# Patient Record
Sex: FEMALE | Race: WHITE | NOT HISPANIC OR LATINO | Employment: FULL TIME | URBAN - METROPOLITAN AREA
[De-identification: names, ages, dates, MRNs, and addresses within clinical notes are randomized per-mention and may not be internally consistent; named-entity substitution may affect disease eponyms.]

---

## 2017-05-24 ENCOUNTER — PATIENT MESSAGE (OUTPATIENT)
Dept: OBSTETRICS AND GYNECOLOGY | Facility: CLINIC | Age: 57
End: 2017-05-24

## 2017-08-17 ENCOUNTER — TELEPHONE (OUTPATIENT)
Dept: OBSTETRICS AND GYNECOLOGY | Facility: CLINIC | Age: 57
End: 2017-08-17

## 2017-08-17 DIAGNOSIS — N60.19 FIBROCYSTIC BREAST, UNSPECIFIED LATERALITY: Primary | ICD-10-CM

## 2017-08-17 NOTE — TELEPHONE ENCOUNTER
----- Message from Irma Vasquez sent at 8/17/2017  3:51 PM CDT -----  Contact: sister  x_  1st Request  _  2nd Request  _  3rd Request      Who:Chiara     Why: Requesting Diagnostic MMG Orders     What Number to Call Back: [phone]    When to Expect a call back: (Before the end of the day)   -- if call after 3:00 call back will be tomorrow.

## 2017-09-27 ENCOUNTER — HOSPITAL ENCOUNTER (OUTPATIENT)
Dept: RADIOLOGY | Facility: HOSPITAL | Age: 57
Discharge: HOME | End: 2017-09-27
Attending: OBSTETRICS & GYNECOLOGY
Payer: COMMERCIAL

## 2017-09-27 VITALS — HEIGHT: 63 IN | WEIGHT: 168 LBS | BODY MASS INDEX: 29.77 KG/M2

## 2017-09-27 DIAGNOSIS — N60.19 FIBROCYSTIC BREAST, UNSPECIFIED LATERALITY: ICD-10-CM

## 2017-09-27 PROCEDURE — 77062 BREAST TOMOSYNTHESIS BI: CPT | Mod: TC

## 2017-09-27 PROCEDURE — 77062 BREAST TOMOSYNTHESIS BI: CPT | Mod: 26,,, | Performed by: RADIOLOGY

## 2017-09-27 PROCEDURE — 77066 DX MAMMO INCL CAD BI: CPT | Mod: 26,,, | Performed by: RADIOLOGY

## 2017-09-29 ENCOUNTER — TELEPHONE (OUTPATIENT)
Dept: OBSTETRICS AND GYNECOLOGY | Facility: CLINIC | Age: 57
End: 2017-09-29

## 2017-09-29 NOTE — TELEPHONE ENCOUNTER
Spoke with Nenita Denis  to schedule a consult with Emma Mckenzie NP at the [facility] due to having a Pavaner-Rush lifetime breast cancer risk of 24.46%.     Left message. Awaiting return call.

## 2017-09-29 NOTE — TELEPHONE ENCOUNTER
----- Message from Zuleika Ahn MA sent at 9/29/2017  8:27 AM CDT -----      ----- Message -----  From: Natali Castellon MD  Sent: 9/28/2017   9:10 PM  To: Ravinder ECHEVARRIA Staff    Needs consult at [facility] with Emma for high risk breast cancer

## 2017-10-02 ENCOUNTER — TELEPHONE (OUTPATIENT)
Dept: OBSTETRICS AND GYNECOLOGY | Facility: CLINIC | Age: 57
End: 2017-10-02

## 2017-10-02 NOTE — TELEPHONE ENCOUNTER
Spoke with Nenita Denis  to schedule a CONSULT with Emma Mckenzie at the [facility] due to having a [facility] lifetime breast cancer risk of 24.46%.    I reassured her that her mammogram was normal, but that this is a new tool used to help identify those at a higher risk for breast cancer in the future.  At the consult, a plan can be created for more frequent monitoring and in turn early detection of any breast cancer that may develop in the future.    Pt verbalized understanding and appointment was scheduled for November 2, 2017 at 1:30pm.

## 2017-10-02 NOTE — TELEPHONE ENCOUNTER
----- Message from Zuleika Ahn MA sent at 9/29/2017  8:27 AM CDT -----      ----- Message -----  From: Natali Castellon MD  Sent: 9/28/2017   9:10 PM  To: Ravinder ECHEVARRIA Staff    Needs consult at [facility] with Emma for high risk breast cancer

## 2017-10-05 PROBLEM — F98.8 ATTENTION DEFICIT DISORDER (ADD) WITHOUT HYPERACTIVITY: Status: ACTIVE | Noted: 2017-10-05

## 2017-11-02 ENCOUNTER — OFFICE VISIT (OUTPATIENT)
Dept: SURGERY | Facility: CLINIC | Age: 57
End: 2017-11-02
Payer: COMMERCIAL

## 2017-11-02 VITALS
DIASTOLIC BLOOD PRESSURE: 83 MMHG | BODY MASS INDEX: 30.56 KG/M2 | HEART RATE: 99 BPM | HEIGHT: 63 IN | WEIGHT: 172.5 LBS | SYSTOLIC BLOOD PRESSURE: 139 MMHG | TEMPERATURE: 98 F

## 2017-11-02 DIAGNOSIS — Z12.39 BREAST CANCER SCREENING, HIGH RISK PATIENT: Primary | ICD-10-CM

## 2017-11-02 PROCEDURE — 99202 OFFICE O/P NEW SF 15 MIN: CPT | Mod: S$GLB,,, | Performed by: NURSE PRACTITIONER

## 2017-11-02 PROCEDURE — 99999 PR PBB SHADOW E&M-EST. PATIENT-LVL III: CPT | Mod: PBBFAC,,, | Performed by: NURSE PRACTITIONER

## 2017-11-02 NOTE — PROGRESS NOTES
Subjective:      Patient ID: Nenita Denis is a 57 y.o. female.    Chief Complaint: Breast Cancer Screening (High Risk Screening)      HPI: (PF, EPF - 1-3) (Detailed, Comp, - 4) patient presents today to discuss breast cancer risk, referred by Dr Castellon. Patient denies palpable breast mass, pain, nipple discharge, redness, increased warmth. She declines CBE today    Menarche at 13    Menopause at 51, no HRt    2017 mmg with no abnormality reported, heterogeneously dense, Santy-Marge: 24.46 %    Last CBE by Dr Castellon 2016    Review of Systems  Objective:   Physical Exam  Assessment:       1. Breast cancer screening, high risk patient        Plan:       Discussed risk factors for breast cancer and TC score, reflective of her having a mother with breast cancer, overweight, and no pregnancies. She also is a current smoker. Discussed her mother's cancer was most probably sporadic. Discussed environmental and modifiable risk factors for breast cancer. Discussed there are several models available to estimate a woman's risk for breast cancer with Santy Beverlyck being the model chosen by [facility]. Discussed this is an estimated risk and that an individuals risk may be lower or higher than this actual score. Discussed general population risk for breast cancer and high risk now being defined by ACS and NCCN as 20% or greater with options for increased screenings including semiannual CBE, annual mmg and annual MRI to alternate every 6 months. Discussed pros and cons of breast MRI. Discussed smoking cessation, healthy diet and exercise , and limit alcohol intake to less than one drink/day.     She desires to proceed with breast MRI if covered by her insurance. Will plan on her returning in 6 months for CBE and breast MRI, and she will see Dr Castellon this month as well for CBE as she declined this with me today.     Discussed s/s of breast cancer and to call with any interval changes or concerns     Time in  counseling 30 min, total time 30 min

## 2017-11-02 NOTE — LETTER
November 2, 2017      Natali Castellon MD  [address]  [address]  [place] [address]           [facility]  [address]  [place] [address]  Phone: [phone]  Fax: [phone]          Patient: Nenita Denis   MR Number: [id number]   YOB: 1960   Date of Visit: 11/2/2017       Dear Dr. Natali Castellon:    Thank you for referring Nenita Denis to me for evaluation. Attached you will find relevant portions of my assessment and plan of care.    If you have questions, please do not hesitate to call me. I look forward to following Nenita Denis along with you.    Sincerely,    Emma Mckenzie, ANGELA    Enclosure  CC:  No Recipients    If you would like to receive this communication electronically, please contact [email or web address] or [phone] to request more information on [employer] Link access.    For providers and/or their staff who would like to refer a patient to [facility], please contact us through our one-stop-shop provider referral line, [facility], at [phone].    If you feel you have received this communication in error or would no longer like to receive these types of communications, please e-mail [email or web address]

## 2018-06-29 ENCOUNTER — OFFICE VISIT (OUTPATIENT)
Dept: OBSTETRICS AND GYNECOLOGY | Facility: CLINIC | Age: 58
End: 2018-06-29
Attending: OBSTETRICS & GYNECOLOGY
Payer: COMMERCIAL

## 2018-06-29 VITALS
SYSTOLIC BLOOD PRESSURE: 128 MMHG | DIASTOLIC BLOOD PRESSURE: 82 MMHG | HEIGHT: 63 IN | WEIGHT: 174.63 LBS | BODY MASS INDEX: 30.94 KG/M2

## 2018-06-29 DIAGNOSIS — Z80.3 FAMILY HISTORY OF BREAST CANCER: Primary | ICD-10-CM

## 2018-06-29 PROCEDURE — 99396 PREV VISIT EST AGE 40-64: CPT | Mod: S$GLB,,, | Performed by: OBSTETRICS & GYNECOLOGY

## 2018-06-29 NOTE — PROGRESS NOTES
SUBJECTIVE:   58 y.o. female   for annual routine Pap and checkup. No LMP recorded. Patient is postmenopausal..  She reports having a reaction to 2 of her medications- had vuvlar swelling- this has resolved. .        Past Medical History:   Diagnosis Date    GERD (gastroesophageal reflux disease)     Hypertension      Past Surgical History:   Procedure Laterality Date    TONSILLECTOMY, ADENOIDECTOMY       Social History     Social History    Marital status: Single     Spouse name: N/A    Number of children: 0    Years of education: N/A     Occupational History    Not on file.     Social History Main Topics    Smoking status: Current Every Day Smoker     Packs/day: 0.50     Years: 10.00     Types: Cigarettes    Smokeless tobacco: Never Used    Alcohol use Yes      Comment: socially    Drug use: No    Sexual activity: Yes     Partners: Male     Other Topics Concern    Not on file     Social History Narrative    No narrative on file     Family History   Problem Relation Age of Onset    Breast cancer Mother 42    Colon cancer Father     No Known Problems Sister     No Known Problems Brother     Ovarian cancer Neg Hx      OB History    Para Term  AB Living   0   0         SAB TAB Ectopic Multiple Live Births                             Current Outpatient Prescriptions   Medication Sig Dispense Refill    b complex vitamins capsule Take 1 capsule by mouth once daily.      brompheniramine-pseudoeph-DM 2-30-10 mg/5 mL Syrp Take 5 mLs by mouth as needed.  1    buPROPion (WELLBUTRIN XL) 150 MG TB24 tablet Take 1 tablet (150 mg total) by mouth once daily. 30 tablet 3    cyanocobalamin (VITAMIN B-12) 500 MCG tablet Take 500 mcg by mouth once daily.      dextroamphetamine-amphetamine (ADDERALL XR) 30 MG 24 hr capsule Take 1 capsule (30 mg total) by mouth every morning. 30 capsule 0    hydroCHLOROthiazide (HYDRODIURIL) 25 MG tablet Take 1 tablet (25 mg total) by mouth once daily. 30  tablet 5    multivitamin (ONE DAILY MULTIVITAMIN) per tablet Take 1 tablet by mouth once daily.      omega-3 fatty acids-vitamin E (FISH OIL) 1,000 mg Cap Take 2 capsules by mouth once daily at 6am.      PROBIOTIC FORMULA, INULIN, 1 billion-250 cell-mg Cap Take 1 tablet by mouth once daily.  98     No current facility-administered medications for this visit.      Allergies: Patient has no known allergies.     The 10-year ASCVD risk score (Jenifer CAROLYN Jr., et al., 2013) is: 5.7%    Values used to calculate the score:      Age: 58 years      Sex: Female      Is Non- : No      Diabetic: No      Tobacco smoker: Yes      Systolic Blood Pressure: 128 mmHg      Is BP treated: Yes      HDL Cholesterol: 81 mg/dL      Total Cholesterol: 213 mg/dL      ROS:  Constitutional: no weight loss, weight gain, fever, fatigue  Eyes:  No vision changes, glasses/contacts  ENT/Mouth: No ulcers, sinus problems, ears ringing, headache  Cardiovascular: No inability to lie flat, chest pain, exercise intolerance, swelling, heart palpitations  Respiratory: No wheezing, coughing blood, shortness of breath, or cough  Gastrointestinal: No diarrhea, bloody stool, nausea/vomiting, constipation, gas, hemorrhoids  Genitourinary: No blood in urine, painful urination, urgency of urination, frequency of urination, incomplete emptying, incontinence, abnormal bleeding, painful periods, heavy periods, vaginal discharge, vaginal odor, painful intercourse, sexual problems, bleeding after intercourse.  Musculoskeletal: No muscle weakness  Skin/Breast: No painful breasts, nipple discharge, masses, rash, ulcers  Neurological: No passing out, seizures, numbness, headache  Endocrine: No diabetes, hypothyroid, hyperthyroid, hot flashes, hair loss, abnormal hair growth, acne  Psychiatric: No depression, crying  Hematologic: No bruises, bleeding, swollen lymph nodes, anemia.      Physical Exam:   Constitutional: She is oriented to person,  place, and time. She appears well-developed and well-nourished.      Neck: Normal range of motion. No tracheal deviation present. No thyromegaly present.    Cardiovascular: Exam reveals no edema.     Pulmonary/Chest: Effort normal. She exhibits no mass, no tenderness, no deformity and no retraction. Right breast exhibits no inverted nipple, no mass, no nipple discharge, no skin change, no tenderness, presence, no bleeding and no swelling. Left breast exhibits no inverted nipple, no mass, no nipple discharge, no skin change, no tenderness, presence, no bleeding and no swelling. Breasts are symmetrical.        Abdominal: Soft. She exhibits no distension and no mass. There is no tenderness. There is no rebound and no guarding. No hernia. Hernia confirmed negative in the left inguinal area.     Genitourinary: Vagina normal and uterus normal. Rectal exam shows no external hemorrhoid. There is no rash, tenderness or lesion on the right labia. There is no rash, tenderness or lesion on the left labia. Uterus is not deviated. Cervix is normal. No no adexnal prolapse. Right adnexum displays no mass, no tenderness and no fullness. Left adnexum displays no mass, no tenderness and no fullness. No tenderness, bleeding, rectocele, cystocele or unspecified prolapse of vaginal walls in the vagina. No vaginal discharge found. Cervix exhibits no motion tenderness, no discharge and no friability.           Musculoskeletal: Normal range of motion and moves all extremeties. She exhibits no edema.      Lymphadenopathy:        Right: No inguinal adenopathy present.        Left: No inguinal adenopathy present.    Neurological: She is alert and oriented to person, place, and time.    Skin: No rash noted. No erythema. No pallor.    Psychiatric: She has a normal mood and affect. Her behavior is normal. Judgment and thought content normal.         ASSESSMENT:   well woman    PLAN:   mammogram  pap smear  return annually or prn

## 2018-10-01 ENCOUNTER — HOSPITAL ENCOUNTER (OUTPATIENT)
Dept: RADIOLOGY | Facility: HOSPITAL | Age: 58
Discharge: HOME | End: 2018-10-01
Attending: OBSTETRICS & GYNECOLOGY
Payer: COMMERCIAL

## 2018-10-01 VITALS — BODY MASS INDEX: 30.83 KG/M2 | HEIGHT: 63 IN | WEIGHT: 174 LBS

## 2018-10-01 DIAGNOSIS — Z80.3 FAMILY HISTORY OF BREAST CANCER: ICD-10-CM

## 2018-10-01 PROCEDURE — 77066 DX MAMMO INCL CAD BI: CPT | Mod: 26,,, | Performed by: RADIOLOGY

## 2018-10-01 PROCEDURE — 77062 BREAST TOMOSYNTHESIS BI: CPT | Mod: 26,,, | Performed by: RADIOLOGY

## 2018-10-01 PROCEDURE — 77062 BREAST TOMOSYNTHESIS BI: CPT | Mod: TC,PO

## 2019-11-04 ENCOUNTER — TELEPHONE (OUTPATIENT)
Dept: OBSTETRICS AND GYNECOLOGY | Facility: CLINIC | Age: 59
End: 2019-11-04

## 2019-11-04 DIAGNOSIS — R92.30 DENSE BREAST TISSUE: Primary | ICD-10-CM

## 2019-11-04 NOTE — TELEPHONE ENCOUNTER
Pt requested order to be sent to [facility] for diagnostic mammogram with philip.    -Sarah    ----- Message from Emma Ford sent at 11/4/2019  9:54 AM CST -----  Pt called asking for  About appt for her Mammo    Pt can be reached at [phone]

## 2019-11-11 ENCOUNTER — TELEPHONE (OUTPATIENT)
Dept: OBSTETRICS AND GYNECOLOGY | Facility: CLINIC | Age: 59
End: 2019-11-11

## 2019-11-11 DIAGNOSIS — Z12.31 ENCOUNTER FOR SCREENING MAMMOGRAM FOR MALIGNANT NEOPLASM OF BREAST: ICD-10-CM

## 2019-11-11 DIAGNOSIS — R92.30 DENSE BREAST TISSUE ON MAMMOGRAM: Primary | ICD-10-CM

## 2019-11-11 NOTE — TELEPHONE ENCOUNTER
----- Message from Abby Huang sent at 11/11/2019  7:50 AM CST -----  Contact: SHEEBA MORALES   Name of Who is Calling: SHEEBA MORALES       What is the request in detail: Patient is requesting a call back regarding the type of mammogram scheduled for the patient. Please contact to further advise.      Can the clinic reply by [facility]: NO      What [facility]: [phone]

## 2019-11-11 NOTE — TELEPHONE ENCOUNTER
Pt calling stating she needs a order for the diagnostic mammogram to be a diagnostic screening mammogram. Advised pt there is no screening diagnostic mammogram. But we can order a screening philip mammogram. Pt wants to the screening. Ordered and scheduled

## 2019-11-12 ENCOUNTER — HOSPITAL ENCOUNTER (OUTPATIENT)
Dept: RADIOLOGY | Facility: HOSPITAL | Age: 59
Discharge: HOME | End: 2019-11-12
Attending: OBSTETRICS & GYNECOLOGY
Payer: COMMERCIAL

## 2019-11-12 DIAGNOSIS — Z12.31 ENCOUNTER FOR SCREENING MAMMOGRAM FOR MALIGNANT NEOPLASM OF BREAST: ICD-10-CM

## 2019-11-12 PROCEDURE — 77063 MAMMO DIGITAL SCREENING BILAT WITH TOMOSYNTHESIS_CAD: ICD-10-PCS | Mod: 26,,, | Performed by: RADIOLOGY

## 2019-11-12 PROCEDURE — 77067 MAMMO DIGITAL SCREENING BILAT WITH TOMOSYNTHESIS_CAD: ICD-10-PCS | Mod: 26,,, | Performed by: RADIOLOGY

## 2019-11-12 PROCEDURE — 77063 BREAST TOMOSYNTHESIS BI: CPT | Mod: TC

## 2019-11-12 PROCEDURE — 77067 SCR MAMMO BI INCL CAD: CPT | Mod: 26,,, | Performed by: RADIOLOGY

## 2019-11-12 PROCEDURE — 77063 BREAST TOMOSYNTHESIS BI: CPT | Mod: 26,,, | Performed by: RADIOLOGY

## 2019-11-18 ENCOUNTER — TELEPHONE (OUTPATIENT)
Dept: OBSTETRICS AND GYNECOLOGY | Facility: CLINIC | Age: 59
End: 2019-11-18

## 2019-11-18 DIAGNOSIS — Z91.89 AT HIGH RISK FOR BREAST CANCER: Primary | ICD-10-CM

## 2019-11-20 ENCOUNTER — TELEPHONE (OUTPATIENT)
Dept: SURGERY | Facility: CLINIC | Age: 59
End: 2019-11-20

## 2019-11-20 NOTE — TELEPHONE ENCOUNTER
Contacted the patient regarding the message below.  The patient did not answer, message left with my name and direct number to contact the office back regarding this matter.

## 2019-11-20 NOTE — TELEPHONE ENCOUNTER
----- Message from Zuleika Ahn MA sent at 11/18/2019  2:55 PM CST -----  Shahana Robles  Pt needs a appointment for elevated T score  Thanks Zuleika

## 2019-12-24 ENCOUNTER — PATIENT MESSAGE (OUTPATIENT)
Dept: SURGERY | Facility: CLINIC | Age: 59
End: 2019-12-24

## 2019-12-24 ENCOUNTER — TELEPHONE (OUTPATIENT)
Dept: SURGERY | Facility: CLINIC | Age: 59
End: 2019-12-24

## 2019-12-24 NOTE — TELEPHONE ENCOUNTER
2nd attempt to contact the patient regarding scheduling high risk screening appointment.  The patient did not answer, message left with my name and direct number to contact regarding this matter.  's office notified.

## 2020-02-26 ENCOUNTER — TELEPHONE (OUTPATIENT)
Dept: INTERNAL MEDICINE | Facility: CLINIC | Age: 60
End: 2020-02-26

## 2020-02-26 ENCOUNTER — PATIENT OUTREACH (OUTPATIENT)
Dept: ADMINISTRATIVE | Facility: HOSPITAL | Age: 60
End: 2020-02-26

## 2020-02-26 NOTE — TELEPHONE ENCOUNTER
----- Message from Jessica Trent sent at 2/24/2020  7:40 PM CST -----  Contact: pt portal  Appointment Request From: Nenita Denis    With Provider: Ara Thornton    Preferred Date Range: 2/27/2020 - 2/28/2020    Preferred Times: Any time    Reason for visit: new patient    Comments:  Anual checkup

## 2022-04-05 ENCOUNTER — PATIENT MESSAGE (OUTPATIENT)
Dept: OTOLARYNGOLOGY | Facility: CLINIC | Age: 62
End: 2022-04-05

## 2022-04-05 ENCOUNTER — OFFICE VISIT (OUTPATIENT)
Dept: OTOLARYNGOLOGY | Facility: CLINIC | Age: 62
End: 2022-04-05
Payer: COMMERCIAL

## 2022-04-05 VITALS
OXYGEN SATURATION: 98 % | SYSTOLIC BLOOD PRESSURE: 130 MMHG | DIASTOLIC BLOOD PRESSURE: 71 MMHG | HEIGHT: 63 IN | WEIGHT: 177.63 LBS | HEART RATE: 105 BPM | BODY MASS INDEX: 31.47 KG/M2 | RESPIRATION RATE: 18 BRPM

## 2022-04-05 DIAGNOSIS — M95.0 NASAL VALVE COLLAPSE: ICD-10-CM

## 2022-04-05 DIAGNOSIS — S09.92XS INJURY OF NOSE, SEQUELA: Primary | ICD-10-CM

## 2022-04-05 DIAGNOSIS — J34.3 HYPERTROPHY OF INFERIOR NASAL TURBINATE: ICD-10-CM

## 2022-04-05 DIAGNOSIS — L98.8 RHYTIDOSIS FACIALIS: ICD-10-CM

## 2022-04-05 DIAGNOSIS — J34.89 NASAL OBSTRUCTION: ICD-10-CM

## 2022-04-05 DIAGNOSIS — J34.2 DEVIATED NASAL SEPTUM: ICD-10-CM

## 2022-04-05 PROCEDURE — 3075F SYST BP GE 130 - 139MM HG: CPT | Mod: CPTII,S$GLB,, | Performed by: OTOLARYNGOLOGY

## 2022-04-05 PROCEDURE — 1160F RVW MEDS BY RX/DR IN RCRD: CPT | Mod: CPTII,S$GLB,, | Performed by: OTOLARYNGOLOGY

## 2022-04-05 PROCEDURE — 1160F PR REVIEW ALL MEDS BY PRESCRIBER/CLIN PHARMACIST DOCUMENTED: ICD-10-PCS | Mod: CPTII,S$GLB,, | Performed by: OTOLARYNGOLOGY

## 2022-04-05 PROCEDURE — 1159F PR MEDICATION LIST DOCUMENTED IN MEDICAL RECORD: ICD-10-PCS | Mod: CPTII,S$GLB,, | Performed by: OTOLARYNGOLOGY

## 2022-04-05 PROCEDURE — 1159F MED LIST DOCD IN RCRD: CPT | Mod: CPTII,S$GLB,, | Performed by: OTOLARYNGOLOGY

## 2022-04-05 PROCEDURE — 99204 PR OFFICE/OUTPT VISIT, NEW, LEVL IV, 45-59 MIN: ICD-10-PCS | Mod: S$GLB,,, | Performed by: OTOLARYNGOLOGY

## 2022-04-05 PROCEDURE — 3078F DIAST BP <80 MM HG: CPT | Mod: CPTII,S$GLB,, | Performed by: OTOLARYNGOLOGY

## 2022-04-05 PROCEDURE — 99204 OFFICE O/P NEW MOD 45 MIN: CPT | Mod: S$GLB,,, | Performed by: OTOLARYNGOLOGY

## 2022-04-05 PROCEDURE — 3008F PR BODY MASS INDEX (BMI) DOCUMENTED: ICD-10-PCS | Mod: CPTII,S$GLB,, | Performed by: OTOLARYNGOLOGY

## 2022-04-05 PROCEDURE — 3078F PR MOST RECENT DIASTOLIC BLOOD PRESSURE < 80 MM HG: ICD-10-PCS | Mod: CPTII,S$GLB,, | Performed by: OTOLARYNGOLOGY

## 2022-04-05 PROCEDURE — 3008F BODY MASS INDEX DOCD: CPT | Mod: CPTII,S$GLB,, | Performed by: OTOLARYNGOLOGY

## 2022-04-05 PROCEDURE — 3075F PR MOST RECENT SYSTOLIC BLOOD PRESS GE 130-139MM HG: ICD-10-PCS | Mod: CPTII,S$GLB,, | Performed by: OTOLARYNGOLOGY

## 2022-04-05 RX ORDER — FLUTICASONE PROPIONATE 50 MCG
2 SPRAY, SUSPENSION (ML) NASAL DAILY
Qty: 16 G | Refills: 11 | Status: SHIPPED | OUTPATIENT
Start: 2022-04-05 | End: 2023-06-29

## 2022-04-05 NOTE — PROGRESS NOTES
OTOLARYNGOLOGY- FACIAL PLASTIC & RECONSTRUCTIVE SURGERY      Nenita Denis  [id number]    CC: nasal trauma    HISTORY OF PRESENT ILLNESS: Nenita Denis  is a 62 y.o. female who was referred to me by Dr. David Wren in consultation for nasal obstruction.  Nenita reports a 1 month history of difficulty breathing on left and right side. The obstruction is worse on the left side  The obstruction is constant.  This began after a fall from standing.  She tripped while taking the garbage out.  After her injury, she reports epistaxis an abrasion on the nasal dorsum.  She denies loss of consciousness.  She was seen in emergency room where x-rays were performed revealing nasal bone fracture.  Her swelling has slowly improved and she has noticed some external deviation of the nose as well as dorsal deformity.  The nasal obstruction has been constant and worse on the left side. There is not a history of nasal allergies/chronic sinus disease.        She also reports some aging facial changes which have been bothersome over the past few years.  She has had some fluctuation of her weight and noticed more skin laxity after weight loss.  She also had Ki Rossi injected 1-2 years ago which did not seem to the improve her submental region but may be added increased skin laxity.  She reports she has always had some fullness of her submental region even at a younger age.    SNOT-22: 40  NOSE: 70%    Past Medical History  She has a past medical history of GERD (gastroesophageal reflux disease), Hypertension, and Plantar fasciitis.    Past Surgical History  She has a past surgical history that includes TONSILLECTOMY, ADENOIDECTOMY.    Family History  Her family history includes Breast cancer (age of onset: 42) in her mother; Colon cancer in her father; No Known Problems in her brother and sister.    Social History  She reports that she has been smoking cigarettes. She has a 5.00 pack-year smoking history. She has never used smokeless  "tobacco. She reports current alcohol use. She reports that she does not use drugs.    Allergies  She has No Known Allergies.    Medications  She has a current medication list which includes the following prescription(s): b complex vitamins, hydrochlorothiazide, magnesium, multivitamin, omega-3 fatty acids-vitamin e, probiotic formula (inulin), bupropion, cyanocobalamin, diethylpropion, fluticasone propionate, and meloxicam submicronized.      Review of Systems     Constitutional: Negative for appetite change, chills, fatigue, fever and unexpected weight loss.      HENT: Positive for sinus pressure.      Eyes:  Negative for change in eyesight, eye drainage, eye itching and photophobia.     Respiratory:  Negative for cough, shortness of breath, sleep apnea, snoring and wheezing.      Cardiovascular:  Negative for chest pain, foot swelling, irregular heartbeat and swollen veins.     Gastrointestinal:  Positive for acid reflux and heartburn.     Genitourinary: Negative for difficulty urinating, sexual problems and frequent urination.     Musc: Negative for aching joints, aching muscles, back pain and neck pain.     Skin: Negative for rash.     Allergy: Negative for food allergies and seasonal allergies.     Endocrine: Negative for cold intolerance and heat intolerance.      Neurological: Negative for dizziness, headaches, light-headedness, seizures and tremors.      Hematologic: Negative for bruises/bleeds easily and swollen glands.      Psychiatric: Positive for nervous/anxious.             PHYSICAL EXAM:  /71 (BP Location: Right arm, Patient Position: Sitting, BP Method: Large (Automatic))   Pulse 105   Resp 18   Ht 5' 3" (1.6 m)   Wt 80.6 kg (177 lb 9.6 oz)   SpO2 98%   BMI 31.46 kg/m²   General: Alert and oriented in no acute distress  Head and Face: Normocephaic, atruamatic  Neurological: Cranial nerves II-XII are grossly intact  Skin: Skin texture is medium thickness rated as Cristina II  Eyes:   " EOMI, sclera clear, PERRL  Ears: Pinna are normal in shape and position  EACs clear bilaterally  TMs clear without YUDELKA or perforation bilaterally  Nose: Nasal skin is medium thickness    On frontal view the dorsum is Irregular, slight reverse C-shape- slight shift of nasal bones to the left with lower 3rd deviation to the right and the nose is proportional compared to the facial features.  The brow-tip aesthetic line is disrupted bilaterally .  Nasal base width is proportional compared to intercanthal distance.    On base view the tip is trapezoidal in shape.  The caudal septum is midline    On profile view the dorsum is Convex with dorsal irregularity with normal projection.  Tip projection is normal and rotation is normal.  Tip support is strong to palpation.  Columellar show is normal. Alar retraction is not present bilaterally. Hanging columella is Absent.    Anterior rhinoscopy reveals the septum is significantly deviated to the left inferiorly without perforation or synechiae.  The turbinates are mildly hypertrophic with edematous mucosa  bilaterally.  There are not pathological secretions present.     The external nasal valve is normal and patent  on the right and normal and patent  on the left without dynamic collapse  The internal nasal valve is narrow on the right and narrow on the left.  Modified Rosy maneuver on the right does improve breathing in the  position only  Modified Rosy maneuver on the left does improve breathing in the  position only    Oral cavity and Oropharynx: Mucous membranes moist without lesions present.  Tongue protrudes midline and palate elevates midline.  Neck: Supple without LAD.    Lungs:breathing comfortably  Psychiatric:  Mood and affect are normal        ASSESSMENT:   1. Injury of nose, sequela    2. Nasal obstruction    3. Nasal valve collapse    4. Hypertrophy of inferior nasal turbinate    5. Deviated nasal septum    6. Rhytidosis facialis             PLAN:     I had a long discussion with the patient regarding her condition and the further workup and management options.     Nenita's nasal obstruction is related to deviated nasal septum, nasal valve collapse, inferior turbinate hypertrophy and significantly impacts her quality of life.  She also has dorsal irregularity as a result of her recent trauma 1 month ago.     We discussed the options for repair including septoplasty, nasal valve repair, submucosal resection of the inferior turbinates.  This would require an open approach.  Discussed waiting between 3-6 months from traumatic event before proceeding with open septorhinoplasty.  Nenita is also interested in cosmetic surgery to address her cervicofacial rhytidosis. We will provide a quote for face/neck lift, submental liposuction.  Will plan on photos and follow-up in approximately 1 month.  All questions answered patient was encouraged call with any questions or concerns.

## 2022-04-06 ENCOUNTER — PATIENT MESSAGE (OUTPATIENT)
Dept: OTOLARYNGOLOGY | Facility: CLINIC | Age: 62
End: 2022-04-06
Payer: COMMERCIAL

## 2022-04-07 ENCOUNTER — PATIENT MESSAGE (OUTPATIENT)
Dept: OTOLARYNGOLOGY | Facility: CLINIC | Age: 62
End: 2022-04-07
Payer: COMMERCIAL

## 2022-04-07 ENCOUNTER — TELEPHONE (OUTPATIENT)
Dept: OTOLARYNGOLOGY | Facility: CLINIC | Age: 62
End: 2022-04-07
Payer: COMMERCIAL

## 2022-04-07 NOTE — TELEPHONE ENCOUNTER
----- Message from Emma Jones MA sent at 4/7/2022 11:55 AM CDT -----  Regarding: pt returning missed call  Type:  Patient Returning Call         Who Called: SHEEBA MORALES [[id number]]         Who Left Message for Patient:Rosa Maria         Does the patient know what this is regarding? Setting a surgery date          Best Call Back Number:[phone]         Additional Information:  ok to contact pt via portal

## 2022-04-07 NOTE — TELEPHONE ENCOUNTER
Spoke with the patient and explained she would get another quote since we had picked a date of 6/27 after speaking with Dr. Davis to get clarification that she needed to wait to heal from her injury before having surgery and that after she gets the quote for the facelift, she will decide how she wants to proceed.  She would like to know central pricing's phone number to call for her estimate out of pocket for her medical portion.  I told her that I would give her that information when she came for her appointment next month.

## 2022-04-07 NOTE — TELEPHONE ENCOUNTER
Left a message for the patient regarding the need to select a date to schedule her sinus surgery so that we can get a date and a more accurate quote for a facelift.  I explained that Dr. Davis is scheduled out pretty far and that I would send that information so that she could get another quote for the facelift.  I asked her to call back at [phone] or she can leave a message on the portal.

## 2022-04-12 ENCOUNTER — PATIENT MESSAGE (OUTPATIENT)
Dept: OTOLARYNGOLOGY | Facility: CLINIC | Age: 62
End: 2022-04-12
Payer: COMMERCIAL

## 2022-04-18 ENCOUNTER — PATIENT MESSAGE (OUTPATIENT)
Dept: OTOLARYNGOLOGY | Facility: CLINIC | Age: 62
End: 2022-04-18
Payer: COMMERCIAL

## 2022-04-28 DIAGNOSIS — J34.3 HYPERTROPHY OF INFERIOR NASAL TURBINATE: ICD-10-CM

## 2022-04-28 DIAGNOSIS — M95.0 NASAL VALVE COLLAPSE: ICD-10-CM

## 2022-04-28 DIAGNOSIS — J34.89 NASAL OBSTRUCTION: Primary | ICD-10-CM

## 2022-04-28 DIAGNOSIS — L98.8 RHYTIDOSIS FACIALIS: ICD-10-CM

## 2022-04-28 DIAGNOSIS — S09.92XS INJURY OF NOSE, SEQUELA: ICD-10-CM

## 2022-04-28 DIAGNOSIS — J34.2 DEVIATED NASAL SEPTUM: ICD-10-CM

## 2022-04-28 RX ORDER — SODIUM CHLORIDE 0.9 % (FLUSH) 0.9 %
3 SYRINGE (ML) INJECTION
Status: CANCELLED | OUTPATIENT
Start: 2022-04-28

## 2022-04-28 RX ORDER — LIDOCAINE HYDROCHLORIDE 10 MG/ML
1 INJECTION, SOLUTION EPIDURAL; INFILTRATION; INTRACAUDAL; PERINEURAL ONCE
Status: CANCELLED | OUTPATIENT
Start: 2022-04-28 | End: 2022-04-28

## 2022-05-05 ENCOUNTER — LAB VISIT (OUTPATIENT)
Dept: LAB | Facility: OTHER | Age: 62
End: 2022-05-05
Attending: OTOLARYNGOLOGY
Payer: COMMERCIAL

## 2022-05-05 ENCOUNTER — OFFICE VISIT (OUTPATIENT)
Dept: OTOLARYNGOLOGY | Facility: CLINIC | Age: 62
End: 2022-05-05
Payer: COMMERCIAL

## 2022-05-05 VITALS — HEART RATE: 80 BPM | TEMPERATURE: 97 F | DIASTOLIC BLOOD PRESSURE: 74 MMHG | SYSTOLIC BLOOD PRESSURE: 139 MMHG

## 2022-05-05 DIAGNOSIS — J34.89 NASAL OBSTRUCTION: ICD-10-CM

## 2022-05-05 DIAGNOSIS — J34.3 HYPERTROPHY OF INFERIOR NASAL TURBINATE: ICD-10-CM

## 2022-05-05 DIAGNOSIS — J34.89 NASAL OBSTRUCTION: Primary | ICD-10-CM

## 2022-05-05 DIAGNOSIS — J34.2 DEVIATED NASAL SEPTUM: ICD-10-CM

## 2022-05-05 DIAGNOSIS — S09.92XS INJURY OF NOSE, SEQUELA: ICD-10-CM

## 2022-05-05 DIAGNOSIS — L98.8 RHYTIDOSIS FACIALIS: ICD-10-CM

## 2022-05-05 DIAGNOSIS — M95.0 NASAL VALVE COLLAPSE: ICD-10-CM

## 2022-05-05 LAB
ANION GAP SERPL CALC-SCNC: 11 MMOL/L (ref 8–16)
BASOPHILS # BLD AUTO: 0.03 K/UL (ref 0–0.2)
BASOPHILS NFR BLD: 0.4 % (ref 0–1.9)
BUN SERPL-MCNC: 12 MG/DL (ref 8–23)
CALCIUM SERPL-MCNC: 10 MG/DL (ref 8.7–10.5)
CHLORIDE SERPL-SCNC: 100 MMOL/L (ref 95–110)
CO2 SERPL-SCNC: 29 MMOL/L (ref 23–29)
CREAT SERPL-MCNC: 0.7 MG/DL (ref 0.5–1.4)
DIFFERENTIAL METHOD: ABNORMAL
EOSINOPHIL # BLD AUTO: 0.1 K/UL (ref 0–0.5)
EOSINOPHIL NFR BLD: 1.1 % (ref 0–8)
ERYTHROCYTE [DISTWIDTH] IN BLOOD BY AUTOMATED COUNT: 11.8 % (ref 11.5–14.5)
EST. GFR  (AFRICAN AMERICAN): >60 ML/MIN/1.73 M^2
EST. GFR  (NON AFRICAN AMERICAN): >60 ML/MIN/1.73 M^2
GLUCOSE SERPL-MCNC: 94 MG/DL (ref 70–110)
HCT VFR BLD AUTO: 44.7 % (ref 37–48.5)
HGB BLD-MCNC: 15.2 G/DL (ref 12–16)
IMM GRANULOCYTES # BLD AUTO: 0.01 K/UL (ref 0–0.04)
IMM GRANULOCYTES NFR BLD AUTO: 0.1 % (ref 0–0.5)
LYMPHOCYTES # BLD AUTO: 2.3 K/UL (ref 1–4.8)
LYMPHOCYTES NFR BLD: 26.9 % (ref 18–48)
MCH RBC QN AUTO: 32.5 PG (ref 27–31)
MCHC RBC AUTO-ENTMCNC: 34 G/DL (ref 32–36)
MCV RBC AUTO: 96 FL (ref 82–98)
MONOCYTES # BLD AUTO: 0.7 K/UL (ref 0.3–1)
MONOCYTES NFR BLD: 7.9 % (ref 4–15)
NEUTROPHILS # BLD AUTO: 5.3 K/UL (ref 1.8–7.7)
NEUTROPHILS NFR BLD: 63.6 % (ref 38–73)
NRBC BLD-RTO: 0 /100 WBC
PLATELET # BLD AUTO: 267 K/UL (ref 150–450)
PMV BLD AUTO: 9.7 FL (ref 9.2–12.9)
POTASSIUM SERPL-SCNC: 3.1 MMOL/L (ref 3.5–5.1)
RBC # BLD AUTO: 4.68 M/UL (ref 4–5.4)
SODIUM SERPL-SCNC: 140 MMOL/L (ref 136–145)
WBC # BLD AUTO: 8.35 K/UL (ref 3.9–12.7)

## 2022-05-05 PROCEDURE — 3078F PR MOST RECENT DIASTOLIC BLOOD PRESSURE < 80 MM HG: ICD-10-PCS | Mod: CPTII,S$GLB,, | Performed by: OTOLARYNGOLOGY

## 2022-05-05 PROCEDURE — 1159F MED LIST DOCD IN RCRD: CPT | Mod: CPTII,S$GLB,, | Performed by: OTOLARYNGOLOGY

## 2022-05-05 PROCEDURE — 36415 COLL VENOUS BLD VENIPUNCTURE: CPT | Performed by: OTOLARYNGOLOGY

## 2022-05-05 PROCEDURE — 3078F DIAST BP <80 MM HG: CPT | Mod: CPTII,S$GLB,, | Performed by: OTOLARYNGOLOGY

## 2022-05-05 PROCEDURE — 85025 COMPLETE CBC W/AUTO DIFF WBC: CPT | Performed by: OTOLARYNGOLOGY

## 2022-05-05 PROCEDURE — 1159F PR MEDICATION LIST DOCUMENTED IN MEDICAL RECORD: ICD-10-PCS | Mod: CPTII,S$GLB,, | Performed by: OTOLARYNGOLOGY

## 2022-05-05 PROCEDURE — 99214 OFFICE O/P EST MOD 30 MIN: CPT | Mod: S$GLB,,, | Performed by: OTOLARYNGOLOGY

## 2022-05-05 PROCEDURE — 3075F SYST BP GE 130 - 139MM HG: CPT | Mod: CPTII,S$GLB,, | Performed by: OTOLARYNGOLOGY

## 2022-05-05 PROCEDURE — 99214 PR OFFICE/OUTPT VISIT, EST, LEVL IV, 30-39 MIN: ICD-10-PCS | Mod: S$GLB,,, | Performed by: OTOLARYNGOLOGY

## 2022-05-05 PROCEDURE — 3075F PR MOST RECENT SYSTOLIC BLOOD PRESS GE 130-139MM HG: ICD-10-PCS | Mod: CPTII,S$GLB,, | Performed by: OTOLARYNGOLOGY

## 2022-05-05 PROCEDURE — 80048 BASIC METABOLIC PNL TOTAL CA: CPT | Performed by: OTOLARYNGOLOGY

## 2022-05-05 NOTE — PROGRESS NOTES
OTOLARYNGOLOGY- FACIAL PLASTIC & RECONSTRUCTIVE SURGERY      Nenita Denis  [id number]    CC: nasal trauma    HISTORY OF PRESENT ILLNESS: Nenita Denis  is a 62 y.o. female who was referred to me by Dr. David Wren in consultation for nasal obstruction.  Nenita reports a 1 month history of difficulty breathing on left and right side. The obstruction is worse on the left side  The obstruction is constant.  This began after a fall from standing.  She tripped while taking the garbage out.  After her injury, she reports epistaxis an abrasion on the nasal dorsum.  She denies loss of consciousness.  She was seen in emergency room where x-rays were performed revealing nasal bone fracture.  Her swelling has slowly improved and she has noticed some external deviation of the nose as well as dorsal deformity.  The nasal obstruction has been constant and worse on the left side. There is not a history of nasal allergies/chronic sinus disease.        She also reports some aging facial changes which have been bothersome over the past few years.  She has had some fluctuation of her weight and noticed more skin laxity after weight loss.  She also had Ki Rossi injected 1-2 years ago which did not seem to the improve her submental region but may be added increased skin laxity.  She reports she has always had some fullness of her submental region even at a younger age.    SNOT-22: 40  NOSE: 70%    Today (05/05/2022): Patient returns for follow up visit. Patient reports improvement in nasal swelling.  Continued nasal obstruction bilaterally.  She does occasionally smoke cigarettes.      Past Medical History  She has a past medical history of GERD (gastroesophageal reflux disease), Hypertension, and Plantar fasciitis.    Past Surgical History  She has a past surgical history that includes TONSILLECTOMY, ADENOIDECTOMY.    Family History  Her family history includes Breast cancer (age of onset: 42) in her mother; Colon cancer in her  father; No Known Problems in her brother and sister.    Social History  She reports that she has been smoking cigarettes. She has a 5.00 pack-year smoking history. She has never used smokeless tobacco. She reports current alcohol use. She reports that she does not use drugs.    Allergies  She has No Known Allergies.    Medications  She has a current medication list which includes the following prescription(s): b complex vitamins, fluticasone propionate, hydrochlorothiazide, magnesium, multivitamin, omega-3 fatty acids-vitamin e, and probiotic formula (inulin).      Review of Systems   Answers for HPI/ROS submitted by the patient on 4/29/2022  None of these: Yes  sinus pressure : Yes  None of these : Yes  None of these: Yes  None of these : Yes  heartburn: Yes  None of these: Yes  back pain: Yes  None of these : Yes  None of these: Yes  None of these : Yes  None of these: Yes  None of these: Yes  None of these: Yes          PHYSICAL EXAM:  /74 (BP Location: Right arm, Patient Position: Sitting, BP Method: Medium (Automatic))   Pulse 80   Temp 97 °F (36.1 °C) (Temporal)   General: Alert and oriented in no acute distress  Head and Face: Normocephaic, atruamatic  Neurological: Cranial nerves II-XII are grossly intact  Skin: Skin texture is medium thickness rated as Cristina II  Eyes:   EOMI, sclera clear, PERRL  Ears: Pinna are normal in shape and position  EACs clear bilaterally  TMs clear without YUDELKA or perforation bilaterally  Nose: Nasal skin is medium thickness    On frontal view the dorsum is Irregular, slight reverse C-shape- slight shift of nasal bones to the left with lower 3rd deviation to the right and the nose is proportional compared to the facial features.  The brow-tip aesthetic line is disrupted bilaterally .  Nasal base width is proportional compared to intercanthal distance.   On base view the tip is trapezoidal in shape.  The caudal septum is midline  On profile view the dorsum is Convex  with dorsal irregularity with normal projection.  Tip projection is normal and rotation is normal.  Tip support is strong to palpation.  Columellar show is normal. Alar retraction is not present bilaterally. Hanging columella is Absent.    Anterior rhinoscopy reveals the septum is significantly deviated to the left inferiorly without perforation or synechiae.  The turbinates are mildly hypertrophic with edematous mucosa  bilaterally.  There are not pathological secretions present.     The external nasal valve is normal and patent  on the right and normal and patent  on the left without dynamic collapse  The internal nasal valve is narrow on the right and narrow on the left.  Modified Defiance maneuver on the right does improve breathing in the  position only  Modified Rosy maneuver on the left does improve breathing in the  position only    Oral cavity and Oropharynx: Mucous membranes moist without lesions present.  Tongue protrudes midline and palate elevates midline.  Neck: Supple without LAD.    Lungs:breathing comfortably  Psychiatric:  Mood and affect are normal        ASSESSMENT:   1. Nasal obstruction    2. Injury of nose, sequela    3. Nasal valve collapse    4. Hypertrophy of inferior nasal turbinate    5. Rhytidosis facialis    6. Deviated nasal septum            PLAN:     I had a long discussion with the patient regarding her condition and the further workup and management options.     Nenita's nasal obstruction is related to deviated nasal septum, nasal valve collapse, inferior turbinate hypertrophy and significantly impacts her quality of life.  She also has dorsal irregularity as a result of her recent trauma.     We discussed the options for repair including septoplasty, nasal valve repair, submucosal resection of the inferior turbinates.  This would require an open approach.  Discussed waiting between 3-6 months from traumatic event before proceeding with open septorhinoplasty.  Nenita is  also interested in cosmetic surgery to address her cervicofacial rhytidosis.  We discussed what can be accomplished with a face/neck lift.  We discussed the risks of face and neck lift and submental liposuction as well as functional septorhinoplasty, SMRT.  The risks include but are not limited to bleeding, scarring, infection, septal perforation, asymmetry, continued nasal obstruction, hematoma, poor wound healing, facial numbness, facial weakness, poor cosmetic result.  Will we discussed postoperative course the timeframe for healing.  Patient voices understanding wished proceed with surgery. Surgery scheduled 6/27/22. All questions answered patient was encouraged call with any questions or concerns.  We also discussed complete cessation of cigarette smoking and avoidance of secondhand smoke 1 month prior in 1 month after surgery.    OP: SMRT, septoplasty, open, dorsal rasp, right  graft, columellar strut, suture left medial footplate, possible SMAS soft tissue dorsal onlay, SML, face/necklift

## 2022-05-06 ENCOUNTER — PATIENT MESSAGE (OUTPATIENT)
Dept: SURGERY | Facility: OTHER | Age: 62
End: 2022-05-06
Payer: COMMERCIAL

## 2022-05-19 ENCOUNTER — TELEPHONE (OUTPATIENT)
Dept: OTOLARYNGOLOGY | Facility: CLINIC | Age: 62
End: 2022-05-19
Payer: COMMERCIAL

## 2022-05-19 NOTE — TELEPHONE ENCOUNTER
Called and spoke with the patient and she informed me of the conversation that Dr. Davis and her had and corrected her post op appts.  I told her I would verify with Dr. Davis when he returned and make sure the appts were all good.

## 2022-05-23 ENCOUNTER — PATIENT MESSAGE (OUTPATIENT)
Dept: SURGERY | Facility: OTHER | Age: 62
End: 2022-05-23
Payer: COMMERCIAL

## 2022-06-17 ENCOUNTER — HOSPITAL ENCOUNTER (OUTPATIENT)
Dept: PREADMISSION TESTING | Facility: OTHER | Age: 62
Discharge: HOME | End: 2022-06-17
Attending: OTOLARYNGOLOGY
Payer: COMMERCIAL

## 2022-06-17 ENCOUNTER — ANESTHESIA EVENT (OUTPATIENT)
Dept: SURGERY | Facility: OTHER | Age: 62
End: 2022-06-17
Payer: COMMERCIAL

## 2022-06-17 VITALS
SYSTOLIC BLOOD PRESSURE: 138 MMHG | TEMPERATURE: 97 F | DIASTOLIC BLOOD PRESSURE: 65 MMHG | BODY MASS INDEX: 30.12 KG/M2 | RESPIRATION RATE: 16 BRPM | HEIGHT: 63 IN | WEIGHT: 170 LBS | OXYGEN SATURATION: 98 %

## 2022-06-17 RX ORDER — ALBUTEROL SULFATE 2.5 MG/.5ML
2.5 SOLUTION RESPIRATORY (INHALATION)
Status: CANCELLED | OUTPATIENT
Start: 2022-06-17 | End: 2022-06-17

## 2022-06-17 RX ORDER — SODIUM CHLORIDE, SODIUM LACTATE, POTASSIUM CHLORIDE, CALCIUM CHLORIDE 600; 310; 30; 20 MG/100ML; MG/100ML; MG/100ML; MG/100ML
INJECTION, SOLUTION INTRAVENOUS CONTINUOUS
Status: CANCELLED | OUTPATIENT
Start: 2022-06-17

## 2022-06-17 RX ORDER — LIDOCAINE HYDROCHLORIDE 10 MG/ML
0.5 INJECTION, SOLUTION EPIDURAL; INFILTRATION; INTRACAUDAL; PERINEURAL ONCE
Status: CANCELLED | OUTPATIENT
Start: 2022-06-17 | End: 2022-06-17

## 2022-06-17 RX ORDER — CLONAZEPAM 1 MG/1
1 TABLET ORAL 2 TIMES DAILY PRN
COMMUNITY

## 2022-06-17 RX ORDER — SCOLOPAMINE TRANSDERMAL SYSTEM 1 MG/1
1 PATCH, EXTENDED RELEASE TRANSDERMAL ONCE
Status: CANCELLED | OUTPATIENT
Start: 2022-06-17 | End: 2022-06-17

## 2022-06-17 RX ORDER — ACETAMINOPHEN 500 MG
1000 TABLET ORAL
Status: CANCELLED | OUTPATIENT
Start: 2022-06-17 | End: 2022-06-17

## 2022-06-17 RX ORDER — PREGABALIN 50 MG/1
50 CAPSULE ORAL
Status: CANCELLED | OUTPATIENT
Start: 2022-06-17 | End: 2022-06-17

## 2022-06-17 NOTE — DISCHARGE INSTRUCTIONS
Information to Prepare you for your Surgery    PRE-ADMIT TESTING -  [phone]    [address] [facility]          Your surgery has been scheduled at [facility]. We are pleased to have the opportunity to serve you. For Further Information please call [phone].    On the day of surgery please report to the Information Desk on the 1st floor.    CONTACT YOUR PHYSICIAN'S OFFICE THE DAY PRIOR TO YOUR SURGERY TO OBTAIN YOUR ARRIVAL TIME.     The evening before surgery do not eat anything after 9 p.m. ( this includes hard candy, chewing gum and mints).  You may only have GATORADE, POWERADE AND WATER  from 9 p.m. until you leave your home.   DO NOT DRINK ANY LIQUIDS ON THE WAY TO THE HOSPITAL.      Why does your anesthesiologist allow you to drink Gatorade/Powerade before surgery?  Gatorade/Powerade helps to increase your comfort before surgery and to decrease your nausea after surgery. The carbohydrates in Gatorade/Powerade help reduce your body's stress response to surgery.  If you are a diabetic-drink only water prior to surgery.      Current Visitor policy(12/27/2021) - Patients may have 2 visitors pre and post procedure. Only 2 visitors will be allowed in the Surgical building with the patient.     SPECIAL MEDICATION INSTRUCTIONS: TAKE medications checked off by the Anesthesiologist on your Medication List.    Angiogram Patients: Take medications as instructed by your physician, including aspirin.     Surgery Patients:    If you take ASPIRIN - Your PHYSICIAN/SURGEON will need to inform you IF/OR when you need to stop taking aspirin prior to your surgery.     Do Not take any medications containing IBUPROFEN.    Do Not Wear any make-up (especially eye make-up) to surgery. Please remove any false eyelashes or eyelash extensions. If you arrive the day of surgery with makeup/eyelashes on you will be required to remove prior to surgery. (There is a risk of corneal  abrasions if eye makeup/eyelash extensions are not removed)      Leave all valuables at home.   Do Not wear any jewelry or watches, including any metal in body piercings. Jewelry must be removed prior to coming to the hospital.  There is a possibility that rings that are unable to be removed may be cut off if they are on the surgical extremity.    Please remove all hair extensions, wigs, clips and any other metal accessories/ ornaments from your hair.  These items may pose a flammable/fire risk in Surgery and must be removed.    Do not shave your surgical area at least 5 days prior to your surgery. The surgical prep will be performed at the hospital according to Infection Control regulations.    Contact Lens must be removed before surgery. Either do not wear the contact lens or bring a case and solution for storage.  Please bring a container for eyeglasses or dentures as required.  Bring any paperwork your physician has provided, such as consent forms,  history and physicals, doctor's orders, etc.   Bring comfortable clothes that are loose fitting to wear upon discharge. Take into consideration the type of surgery being performed.  Maintain your diet as advised per your physician the day prior to surgery.      Adequate rest the night before surgery is advised.   Park in the Parking lot behind the hospital or in the [employer] [facility] across the street from the parking lot. Parking is complimentary.  If you will be discharged the same day as your procedure, please arrange for a responsible adult to drive you home or to accompany you if traveling by taxi.   YOU WILL NOT BE PERMITTED TO DRIVE OR TO LEAVE THE HOSPITAL ALONE AFTER SURGERY.   If you are being discharged the same day, it is strongly recommended that you arrange for someone to remain with you for the first 24 hrs following your surgery.    The Surgeon will speak to your family/visitor after your surgery regarding the outcome of your surgery and post op  care.  The Surgeon may speak to you after your surgery, but there is a possibility you may not remember the details.  Please check with your family members regarding the conversation with the Surgeon.    We strongly recommend whoever is bringing you home be present for discharge instructions.  This will ensure a thorough understanding for your post op home care.    ALL CHILDREN MUST ALWAYS BE ACCOMPANIED BY AN ADULT.    Visitors-Refer to current Visitor policy handouts.    Thank you for your cooperation.  The Staff of [facility].            Bathing Instructions with Hibiclens    Shower the evening before and morning of your procedure with Hibiclens:  Wash your face with water and your regular face wash/soap  Apply Hibiclens directly on your skin or on a wet washcloth and wash gently. When showering: Move away from the shower stream when applying Hibiclens to avoid rinsing off too soon.  Rinse thoroughly with warm water  Do not dilute Hibiclens        Dry off as usual, do not use any deodorant, powder, body lotions, perfume, after shave or cologne.

## 2022-06-17 NOTE — ANESTHESIA PREPROCEDURE EVALUATION
06/17/2022  Nenita Denis is a 62 y.o., female.      Pre-op Assessment    I have reviewed the Patient Summary Reports.     I have reviewed the Nursing Notes. I have reviewed the NPO Status.   I have reviewed the Medications.     Review of Systems  Anesthesia Hx:  PONV History of prior surgery of interest to airway management or planning: Denies Family Hx of Anesthesia complications.  Personal Hx of Anesthesia complications, Post-Operative Nausea/Vomiting   Social:  Smoker, Social Alcohol Use    Hematology/Oncology:  Hematology Normal   Oncology Normal     EENT/Dental:EENT/Dental Normal   Cardiovascular:   Exercise tolerance: good Denies Hypertension.     Pulmonary:  Pulmonary Normal    Hepatic/GI:   GERD, well controlled    Musculoskeletal:   Arthritis     Neurological:  Neurology Normal    Endocrine:  Endocrine Normal    Psych:   Psychiatric History          Physical Exam  General: Cooperative, Alert and Oriented    Airway:  Mallampati: I   Mouth Opening: Normal  Neck ROM: Normal ROM    Dental:  Intact        Anesthesia Plan  Type of Anesthesia, risks & benefits discussed:    Anesthesia Type: Gen ETT  Intra-op Monitoring Plan: Standard ASA Monitors  Post Op Pain Control Plan: multimodal analgesia  Induction:  IV  Airway Plan: Video  Informed Consent: Informed consent signed with the Patient and all parties understand the risks and agree with anesthesia plan.  All questions answered.   ASA Score: 2  Anesthesia Plan Notes: Recent outside labs on paper chart  Smoker    Ready For Surgery From Anesthesia Perspective.     .

## 2022-06-24 ENCOUNTER — TELEPHONE (OUTPATIENT)
Dept: OTOLARYNGOLOGY | Facility: CLINIC | Age: 62
End: 2022-06-24
Payer: COMMERCIAL

## 2022-06-24 NOTE — TELEPHONE ENCOUNTER
----- Message from Sharona Arguello sent at 6/24/2022  2:11 PM CDT -----  Regarding: Requesting time of procedure  Contact: SHEEBA MORALES [[id number]]  Name of Who is Calling:CARMEN KODI [[id number]]          What is the request in detail: Pt states she is calling for her procedure time, Please advise she is requesting a call back           Can the clinic reply by [facility]: No           What Number to Call Back if not in [facility]:[phone]

## 2022-06-24 NOTE — TELEPHONE ENCOUNTER
Nothing to eat after 9pm on Sunday including candy, gum, or mints but can have clear liquids including gatorade or powerade up to the time you leave your home or in your case nothing after 5am.  Please be sure to review your post op instructions in the folder you received and also if there are any questions or concerns after your surgery to please remember to call the after hours number of [phone].  Reviewed follow up appts post surgery.  If you need to go to the ER please go to [facility] on [address]-there is a resident on call to address any ENT needs.   Please remember to stay on top of your pain and stay hydrated.

## 2022-06-27 ENCOUNTER — ANESTHESIA (OUTPATIENT)
Dept: SURGERY | Facility: OTHER | Age: 62
End: 2022-06-27
Payer: COMMERCIAL

## 2022-06-27 ENCOUNTER — HOSPITAL ENCOUNTER (OUTPATIENT)
Facility: OTHER | Age: 62
Discharge: HOME | End: 2022-06-27
Attending: OTOLARYNGOLOGY | Admitting: OTOLARYNGOLOGY
Payer: COMMERCIAL

## 2022-06-27 DIAGNOSIS — J34.89 NASAL OBSTRUCTION: Primary | ICD-10-CM

## 2022-06-27 DIAGNOSIS — J34.3 HYPERTROPHY OF INFERIOR NASAL TURBINATE: ICD-10-CM

## 2022-06-27 DIAGNOSIS — S09.92XS INJURY OF NOSE, SEQUELA: ICD-10-CM

## 2022-06-27 DIAGNOSIS — L98.8 RHYTIDOSIS FACIALIS: ICD-10-CM

## 2022-06-27 DIAGNOSIS — M95.0 NASAL VALVE COLLAPSE: ICD-10-CM

## 2022-06-27 DIAGNOSIS — J34.2 DEVIATED NASAL SEPTUM: ICD-10-CM

## 2022-06-27 PROCEDURE — 27201423 OPTIME MED/SURG SUP & DEVICES STERILE SUPPLY: Performed by: OTOLARYNGOLOGY

## 2022-06-27 PROCEDURE — A4216 STERILE WATER/SALINE, 10 ML: HCPCS | Performed by: OTOLARYNGOLOGY

## 2022-06-27 PROCEDURE — 63600175 PHARM REV CODE 636 W HCPCS: Performed by: NURSE ANESTHETIST, CERTIFIED REGISTERED

## 2022-06-27 PROCEDURE — 94640 AIRWAY INHALATION TREATMENT: CPT

## 2022-06-27 PROCEDURE — 71000015 HC POSTOP RECOV 1ST HR: Performed by: OTOLARYNGOLOGY

## 2022-06-27 PROCEDURE — 71000016 HC POSTOP RECOV ADDL HR: Performed by: OTOLARYNGOLOGY

## 2022-06-27 PROCEDURE — 63600175 PHARM REV CODE 636 W HCPCS: Performed by: OTOLARYNGOLOGY

## 2022-06-27 PROCEDURE — 71000033 HC RECOVERY, INTIAL HOUR: Performed by: OTOLARYNGOLOGY

## 2022-06-27 PROCEDURE — 37000008 HC ANESTHESIA 1ST 15 MINUTES: Performed by: OTOLARYNGOLOGY

## 2022-06-27 PROCEDURE — 63600175 PHARM REV CODE 636 W HCPCS: Performed by: ANESTHESIOLOGY

## 2022-06-27 PROCEDURE — 30140 RESECT INFERIOR TURBINATE: CPT | Mod: 50,51,, | Performed by: OTOLARYNGOLOGY

## 2022-06-27 PROCEDURE — 37000009 HC ANESTHESIA EA ADD 15 MINS: Mod: WS

## 2022-06-27 PROCEDURE — 25000003 PHARM REV CODE 250: Performed by: OTOLARYNGOLOGY

## 2022-06-27 PROCEDURE — P9045 ALBUMIN (HUMAN), 5%, 250 ML: HCPCS | Mod: JG

## 2022-06-27 PROCEDURE — 30465 REPAIR NASAL STENOSIS: CPT | Mod: ,,, | Performed by: OTOLARYNGOLOGY

## 2022-06-27 PROCEDURE — 20912 PR REMV CARTILAGE FOR GRAFT NASAL: ICD-10-PCS | Mod: 51,,, | Performed by: OTOLARYNGOLOGY

## 2022-06-27 PROCEDURE — 30520 PR REPAIR, NASAL SEPTUM: ICD-10-PCS | Mod: 51,,, | Performed by: OTOLARYNGOLOGY

## 2022-06-27 PROCEDURE — 71000039 HC RECOVERY, EACH ADD'L HOUR: Performed by: OTOLARYNGOLOGY

## 2022-06-27 PROCEDURE — 30520 REPAIR OF NASAL SEPTUM: CPT | Mod: 51,,, | Performed by: OTOLARYNGOLOGY

## 2022-06-27 PROCEDURE — 25000003 PHARM REV CODE 250: Performed by: NURSE ANESTHETIST, CERTIFIED REGISTERED

## 2022-06-27 PROCEDURE — 36000707: Mod: WS | Performed by: OTOLARYNGOLOGY

## 2022-06-27 PROCEDURE — C1781 MESH (IMPLANTABLE): HCPCS | Performed by: OTOLARYNGOLOGY

## 2022-06-27 PROCEDURE — 63600175 PHARM REV CODE 636 W HCPCS

## 2022-06-27 PROCEDURE — 25000003 PHARM REV CODE 250: Performed by: ANESTHESIOLOGY

## 2022-06-27 PROCEDURE — 25000003 PHARM REV CODE 250

## 2022-06-27 PROCEDURE — 27100025 HC TUBING, SET FLUID WARMER: Performed by: ANESTHESIOLOGY

## 2022-06-27 PROCEDURE — 37000009 HC ANESTHESIA EA ADD 15 MINS: Performed by: OTOLARYNGOLOGY

## 2022-06-27 PROCEDURE — 15828 RHYTIDECTOMY CHEEK CHN & NCK: CPT | Mod: 50,CSM,, | Performed by: OTOLARYNGOLOGY

## 2022-06-27 PROCEDURE — 36000706: Performed by: OTOLARYNGOLOGY

## 2022-06-27 PROCEDURE — 15828 PR REML OF CHEEK,CHIN,NECK WRINKLES: ICD-10-PCS | Mod: 50,CSM,, | Performed by: OTOLARYNGOLOGY

## 2022-06-27 PROCEDURE — 30465 PR REPAIR NASAL CAVITY STENOSIS: ICD-10-PCS | Mod: ,,, | Performed by: OTOLARYNGOLOGY

## 2022-06-27 PROCEDURE — 25000242 PHARM REV CODE 250 ALT 637 W/ HCPCS

## 2022-06-27 PROCEDURE — 20912 REMOVE CARTILAGE FOR GRAFT: CPT | Mod: 51,,, | Performed by: OTOLARYNGOLOGY

## 2022-06-27 PROCEDURE — 30140 PR EXCISION TURBINATE,SUBMUCOUS: ICD-10-PCS | Mod: 50,51,, | Performed by: OTOLARYNGOLOGY

## 2022-06-27 DEVICE — MESH VICRYL KNITTED 12X12IN: Type: IMPLANTABLE DEVICE | Site: NOSE | Status: FUNCTIONAL

## 2022-06-27 RX ORDER — FAMOTIDINE 10 MG/ML
INJECTION INTRAVENOUS
Status: DISCONTINUED | OUTPATIENT
Start: 2022-06-27 | End: 2022-06-27

## 2022-06-27 RX ORDER — SODIUM CHLORIDE 9 MG/ML
INJECTION, SOLUTION INTRAMUSCULAR; INTRAVENOUS; SUBCUTANEOUS
Status: DISCONTINUED | OUTPATIENT
Start: 2022-06-27 | End: 2022-06-27 | Stop reason: HOSPADM

## 2022-06-27 RX ORDER — SODIUM CHLORIDE, SODIUM LACTATE, POTASSIUM CHLORIDE, CALCIUM CHLORIDE 600; 310; 30; 20 MG/100ML; MG/100ML; MG/100ML; MG/100ML
INJECTION, SOLUTION INTRAVENOUS CONTINUOUS
Status: DISCONTINUED | OUTPATIENT
Start: 2022-06-27 | End: 2022-06-27 | Stop reason: HOSPADM

## 2022-06-27 RX ORDER — BACITRACIN 500 [USP'U]/G
OINTMENT TOPICAL 2 TIMES DAILY
Qty: 28 G | Refills: 0 | Status: SHIPPED | OUTPATIENT
Start: 2022-06-27 | End: 2022-11-23 | Stop reason: ALTCHOICE

## 2022-06-27 RX ORDER — PROCHLORPERAZINE EDISYLATE 5 MG/ML
5 INJECTION INTRAMUSCULAR; INTRAVENOUS EVERY 30 MIN PRN
Status: DISCONTINUED | OUTPATIENT
Start: 2022-06-27 | End: 2022-06-27 | Stop reason: HOSPADM

## 2022-06-27 RX ORDER — FENTANYL CITRATE 50 UG/ML
INJECTION, SOLUTION INTRAMUSCULAR; INTRAVENOUS
Status: DISCONTINUED | OUTPATIENT
Start: 2022-06-27 | End: 2022-06-27

## 2022-06-27 RX ORDER — LIDOCAINE HYDROCHLORIDE 10 MG/ML
1 INJECTION, SOLUTION EPIDURAL; INFILTRATION; INTRACAUDAL; PERINEURAL ONCE
Status: DISCONTINUED | OUTPATIENT
Start: 2022-06-27 | End: 2022-06-27 | Stop reason: HOSPADM

## 2022-06-27 RX ORDER — HYDROMORPHONE HYDROCHLORIDE 2 MG/ML
INJECTION, SOLUTION INTRAMUSCULAR; INTRAVENOUS; SUBCUTANEOUS
Status: DISCONTINUED | OUTPATIENT
Start: 2022-06-27 | End: 2022-06-27

## 2022-06-27 RX ORDER — LIDOCAINE HYDROCHLORIDE AND EPINEPHRINE 10; 10 MG/ML; UG/ML
INJECTION, SOLUTION INFILTRATION; PERINEURAL
Status: DISCONTINUED | OUTPATIENT
Start: 2022-06-27 | End: 2022-06-27 | Stop reason: HOSPADM

## 2022-06-27 RX ORDER — MIDAZOLAM HYDROCHLORIDE 1 MG/ML
INJECTION INTRAMUSCULAR; INTRAVENOUS
Status: DISCONTINUED | OUTPATIENT
Start: 2022-06-27 | End: 2022-06-27

## 2022-06-27 RX ORDER — PROPOFOL 10 MG/ML
VIAL (ML) INTRAVENOUS CONTINUOUS PRN
Status: DISCONTINUED | OUTPATIENT
Start: 2022-06-27 | End: 2022-06-27

## 2022-06-27 RX ORDER — ROCURONIUM BROMIDE 10 MG/ML
INJECTION, SOLUTION INTRAVENOUS
Status: DISCONTINUED | OUTPATIENT
Start: 2022-06-27 | End: 2022-06-27

## 2022-06-27 RX ORDER — ALBUTEROL SULFATE 2.5 MG/.5ML
SOLUTION RESPIRATORY (INHALATION)
Status: COMPLETED
Start: 2022-06-27 | End: 2022-06-27

## 2022-06-27 RX ORDER — DIPHENHYDRAMINE HYDROCHLORIDE 50 MG/ML
INJECTION INTRAMUSCULAR; INTRAVENOUS
Status: DISCONTINUED | OUTPATIENT
Start: 2022-06-27 | End: 2022-06-27

## 2022-06-27 RX ORDER — DEXMEDETOMIDINE HYDROCHLORIDE 100 UG/ML
INJECTION, SOLUTION INTRAVENOUS
Status: DISCONTINUED | OUTPATIENT
Start: 2022-06-27 | End: 2022-06-27

## 2022-06-27 RX ORDER — HALOPERIDOL 5 MG/ML
INJECTION INTRAMUSCULAR
Status: DISCONTINUED | OUTPATIENT
Start: 2022-06-27 | End: 2022-06-27

## 2022-06-27 RX ORDER — AMOXICILLIN AND CLAVULANATE POTASSIUM 500; 125 MG/1; MG/1
1 TABLET, FILM COATED ORAL 2 TIMES DAILY
Qty: 14 TABLET | Refills: 0 | Status: SHIPPED | OUTPATIENT
Start: 2022-06-27 | End: 2022-07-04

## 2022-06-27 RX ORDER — KETAMINE HCL IN 0.9 % NACL 50 MG/5 ML
SYRINGE (ML) INTRAVENOUS
Status: DISCONTINUED | OUTPATIENT
Start: 2022-06-27 | End: 2022-06-27

## 2022-06-27 RX ORDER — HYDROCODONE BITARTRATE AND ACETAMINOPHEN 5; 325 MG/1; MG/1
1 TABLET ORAL EVERY 6 HOURS PRN
Qty: 20 TABLET | Refills: 0 | Status: SHIPPED | OUTPATIENT
Start: 2022-06-27 | End: 2022-07-21

## 2022-06-27 RX ORDER — LIDOCAINE HYDROCHLORIDE 20 MG/ML
INJECTION INTRAVENOUS
Status: DISCONTINUED | OUTPATIENT
Start: 2022-06-27 | End: 2022-06-27

## 2022-06-27 RX ORDER — PREGABALIN 50 MG/1
50 CAPSULE ORAL
Status: COMPLETED | OUTPATIENT
Start: 2022-06-27 | End: 2022-06-27

## 2022-06-27 RX ORDER — ONDANSETRON 2 MG/ML
INJECTION INTRAMUSCULAR; INTRAVENOUS
Status: DISCONTINUED | OUTPATIENT
Start: 2022-06-27 | End: 2022-06-27

## 2022-06-27 RX ORDER — CEFAZOLIN SODIUM 1 G/3ML
2 INJECTION, POWDER, FOR SOLUTION INTRAMUSCULAR; INTRAVENOUS
Status: COMPLETED | OUTPATIENT
Start: 2022-06-27 | End: 2022-06-27

## 2022-06-27 RX ORDER — DEXAMETHASONE SODIUM PHOSPHATE 4 MG/ML
INJECTION, SOLUTION INTRA-ARTICULAR; INTRALESIONAL; INTRAMUSCULAR; INTRAVENOUS; SOFT TISSUE
Status: DISCONTINUED | OUTPATIENT
Start: 2022-06-27 | End: 2022-06-27

## 2022-06-27 RX ORDER — ALBUTEROL SULFATE 2.5 MG/.5ML
2.5 SOLUTION RESPIRATORY (INHALATION)
Status: COMPLETED | OUTPATIENT
Start: 2022-06-27 | End: 2022-06-27

## 2022-06-27 RX ORDER — EPINEPHRINE 1 MG/ML
INJECTION, SOLUTION INTRACARDIAC; INTRAMUSCULAR; INTRAVENOUS; SUBCUTANEOUS
Status: DISCONTINUED | OUTPATIENT
Start: 2022-06-27 | End: 2022-06-27 | Stop reason: HOSPADM

## 2022-06-27 RX ORDER — PHENYLEPHRINE HYDROCHLORIDE 10 MG/ML
INJECTION INTRAVENOUS
Status: DISCONTINUED | OUTPATIENT
Start: 2022-06-27 | End: 2022-06-27

## 2022-06-27 RX ORDER — EPHEDRINE SULFATE 50 MG/ML
INJECTION, SOLUTION INTRAVENOUS
Status: DISCONTINUED | OUTPATIENT
Start: 2022-06-27 | End: 2022-06-27

## 2022-06-27 RX ORDER — MEPERIDINE HYDROCHLORIDE 25 MG/ML
12.5 INJECTION INTRAMUSCULAR; INTRAVENOUS; SUBCUTANEOUS ONCE AS NEEDED
Status: DISCONTINUED | OUTPATIENT
Start: 2022-06-27 | End: 2022-06-27 | Stop reason: HOSPADM

## 2022-06-27 RX ORDER — SODIUM CHLORIDE 0.9 % (FLUSH) 0.9 %
3 SYRINGE (ML) INJECTION
Status: DISCONTINUED | OUTPATIENT
Start: 2022-06-27 | End: 2022-06-27 | Stop reason: HOSPADM

## 2022-06-27 RX ORDER — HYDROMORPHONE HYDROCHLORIDE 2 MG/ML
0.4 INJECTION, SOLUTION INTRAMUSCULAR; INTRAVENOUS; SUBCUTANEOUS EVERY 5 MIN PRN
Status: DISCONTINUED | OUTPATIENT
Start: 2022-06-27 | End: 2022-06-27 | Stop reason: HOSPADM

## 2022-06-27 RX ORDER — PROPOFOL 10 MG/ML
VIAL (ML) INTRAVENOUS
Status: DISCONTINUED | OUTPATIENT
Start: 2022-06-27 | End: 2022-06-27

## 2022-06-27 RX ORDER — ACETAMINOPHEN 10 MG/ML
INJECTION, SOLUTION INTRAVENOUS
Status: DISCONTINUED | OUTPATIENT
Start: 2022-06-27 | End: 2022-06-27

## 2022-06-27 RX ORDER — ONDANSETRON 4 MG/1
4 TABLET, FILM COATED ORAL EVERY 6 HOURS PRN
Qty: 12 TABLET | Refills: 0 | Status: SHIPPED | OUTPATIENT
Start: 2022-06-27 | End: 2022-07-21

## 2022-06-27 RX ORDER — OXYCODONE HYDROCHLORIDE 5 MG/1
5 TABLET ORAL
Status: DISCONTINUED | OUTPATIENT
Start: 2022-06-27 | End: 2022-06-27 | Stop reason: HOSPADM

## 2022-06-27 RX ORDER — LIDOCAINE HYDROCHLORIDE 10 MG/ML
0.5 INJECTION, SOLUTION EPIDURAL; INFILTRATION; INTRACAUDAL; PERINEURAL ONCE
Status: DISCONTINUED | OUTPATIENT
Start: 2022-06-27 | End: 2022-06-27 | Stop reason: HOSPADM

## 2022-06-27 RX ORDER — ALBUMIN HUMAN 50 G/1000ML
SOLUTION INTRAVENOUS CONTINUOUS PRN
Status: DISCONTINUED | OUTPATIENT
Start: 2022-06-27 | End: 2022-06-27

## 2022-06-27 RX ORDER — ACETAMINOPHEN 500 MG
1000 TABLET ORAL
Status: COMPLETED | OUTPATIENT
Start: 2022-06-27 | End: 2022-06-27

## 2022-06-27 RX ORDER — SCOLOPAMINE TRANSDERMAL SYSTEM 1 MG/1
1 PATCH, EXTENDED RELEASE TRANSDERMAL ONCE
Status: COMPLETED | OUTPATIENT
Start: 2022-06-27 | End: 2022-06-27

## 2022-06-27 RX ADMIN — PROPOFOL 100 MCG/KG/MIN: 10 INJECTION, EMULSION INTRAVENOUS at 07:06

## 2022-06-27 RX ADMIN — CEFAZOLIN 2 G: 330 INJECTION, POWDER, FOR SOLUTION INTRAMUSCULAR; INTRAVENOUS at 03:06

## 2022-06-27 RX ADMIN — ROCURONIUM BROMIDE 20 MG: 10 SOLUTION INTRAVENOUS at 03:06

## 2022-06-27 RX ADMIN — Medication 10 MG: at 09:06

## 2022-06-27 RX ADMIN — MIDAZOLAM HYDROCHLORIDE 2 MG: 1 INJECTION, SOLUTION INTRAMUSCULAR; INTRAVENOUS at 07:06

## 2022-06-27 RX ADMIN — SCOLOPAMINE TRANSDERMAL SYSTEM 1 PATCH: 1 PATCH, EXTENDED RELEASE TRANSDERMAL at 06:06

## 2022-06-27 RX ADMIN — ONDANSETRON HYDROCHLORIDE 4 MG: 2 INJECTION INTRAMUSCULAR; INTRAVENOUS at 04:06

## 2022-06-27 RX ADMIN — DEXMEDETOMIDINE HYDROCHLORIDE 4 MCG: 100 INJECTION, SOLUTION, CONCENTRATE INTRAVENOUS at 10:06

## 2022-06-27 RX ADMIN — CEFAZOLIN 2 G: 330 INJECTION, POWDER, FOR SOLUTION INTRAMUSCULAR; INTRAVENOUS at 07:06

## 2022-06-27 RX ADMIN — SUGAMMADEX 150 MG: 100 INJECTION, SOLUTION INTRAVENOUS at 04:06

## 2022-06-27 RX ADMIN — ROCURONIUM BROMIDE 15 MG: 10 SOLUTION INTRAVENOUS at 09:06

## 2022-06-27 RX ADMIN — Medication 20 MG: at 07:06

## 2022-06-27 RX ADMIN — HYDROMORPHONE HYDROCHLORIDE 0.4 MG: 2 INJECTION INTRAMUSCULAR; INTRAVENOUS; SUBCUTANEOUS at 04:06

## 2022-06-27 RX ADMIN — CEFAZOLIN 2 G: 330 INJECTION, POWDER, FOR SOLUTION INTRAMUSCULAR; INTRAVENOUS at 11:06

## 2022-06-27 RX ADMIN — HALOPERIDOL LACTATE 0.5 MG: 5 INJECTION, SOLUTION INTRAMUSCULAR at 12:06

## 2022-06-27 RX ADMIN — DEXMEDETOMIDINE HYDROCHLORIDE 8 MCG: 100 INJECTION, SOLUTION, CONCENTRATE INTRAVENOUS at 02:06

## 2022-06-27 RX ADMIN — PHENYLEPHRINE HYDROCHLORIDE 0.1 MCG/KG/MIN: 10 INJECTION INTRAVENOUS at 07:06

## 2022-06-27 RX ADMIN — Medication 10 MG: at 08:06

## 2022-06-27 RX ADMIN — FENTANYL CITRATE 50 MCG: 50 INJECTION, SOLUTION INTRAMUSCULAR; INTRAVENOUS at 02:06

## 2022-06-27 RX ADMIN — FENTANYL CITRATE 50 MCG: 50 INJECTION, SOLUTION INTRAMUSCULAR; INTRAVENOUS at 01:06

## 2022-06-27 RX ADMIN — DEXMEDETOMIDINE HYDROCHLORIDE 4 MCG: 100 INJECTION, SOLUTION, CONCENTRATE INTRAVENOUS at 09:06

## 2022-06-27 RX ADMIN — OXYCODONE 5 MG: 5 TABLET ORAL at 06:06

## 2022-06-27 RX ADMIN — DEXAMETHASONE SODIUM PHOSPHATE 12 MG: 4 INJECTION, SOLUTION INTRAMUSCULAR; INTRAVENOUS at 07:06

## 2022-06-27 RX ADMIN — PHENYLEPHRINE HYDROCHLORIDE 80 MCG: 10 INJECTION INTRAVENOUS at 08:06

## 2022-06-27 RX ADMIN — ALBUMIN (HUMAN): 12.5 SOLUTION INTRAVENOUS at 07:06

## 2022-06-27 RX ADMIN — ROCURONIUM BROMIDE 20 MG: 10 SOLUTION INTRAVENOUS at 08:06

## 2022-06-27 RX ADMIN — ACETAMINOPHEN 1000 MG: 500 TABLET, FILM COATED ORAL at 06:06

## 2022-06-27 RX ADMIN — ACETAMINOPHEN 1000 MG: 10 INJECTION, SOLUTION INTRAVENOUS at 03:06

## 2022-06-27 RX ADMIN — DIPHENHYDRAMINE HYDROCHLORIDE 12.5 MG: 50 INJECTION, SOLUTION INTRAMUSCULAR; INTRAVENOUS at 07:06

## 2022-06-27 RX ADMIN — ROCURONIUM BROMIDE 20 MG: 10 SOLUTION INTRAVENOUS at 12:06

## 2022-06-27 RX ADMIN — EPHEDRINE SULFATE 10 MG: 50 INJECTION INTRAVENOUS at 08:06

## 2022-06-27 RX ADMIN — PROPOFOL 200 MG: 10 INJECTION, EMULSION INTRAVENOUS at 07:06

## 2022-06-27 RX ADMIN — DEXMEDETOMIDINE HYDROCHLORIDE 4 MCG: 100 INJECTION, SOLUTION, CONCENTRATE INTRAVENOUS at 12:06

## 2022-06-27 RX ADMIN — PHENYLEPHRINE HYDROCHLORIDE 80 MCG: 10 INJECTION INTRAVENOUS at 07:06

## 2022-06-27 RX ADMIN — GLYCOPYRROLATE 0.2 MG: 0.2 INJECTION, SOLUTION INTRAMUSCULAR; INTRAVITREAL at 07:06

## 2022-06-27 RX ADMIN — ROCURONIUM BROMIDE 20 MG: 10 SOLUTION INTRAVENOUS at 02:06

## 2022-06-27 RX ADMIN — PREGABALIN 50 MG: 50 CAPSULE ORAL at 06:06

## 2022-06-27 RX ADMIN — FENTANYL CITRATE 100 MCG: 50 INJECTION, SOLUTION INTRAMUSCULAR; INTRAVENOUS at 07:06

## 2022-06-27 RX ADMIN — SODIUM CHLORIDE, SODIUM LACTATE, POTASSIUM CHLORIDE, AND CALCIUM CHLORIDE: 600; 310; 30; 20 INJECTION, SOLUTION INTRAVENOUS at 06:06

## 2022-06-27 RX ADMIN — Medication 10 MG: at 10:06

## 2022-06-27 RX ADMIN — HYDROMORPHONE HYDROCHLORIDE 0.4 MG: 2 INJECTION INTRAMUSCULAR; INTRAVENOUS; SUBCUTANEOUS at 03:06

## 2022-06-27 RX ADMIN — CARBOXYMETHYLCELLULOSE SODIUM 2 DROP: 2.5 SOLUTION/ DROPS OPHTHALMIC at 07:06

## 2022-06-27 RX ADMIN — ROCURONIUM BROMIDE 50 MG: 10 SOLUTION INTRAVENOUS at 07:06

## 2022-06-27 RX ADMIN — PROPOFOL 100 MCG/KG/MIN: 10 INJECTION, EMULSION INTRAVENOUS at 11:06

## 2022-06-27 RX ADMIN — FAMOTIDINE 20 MG: 10 INJECTION, SOLUTION INTRAVENOUS at 07:06

## 2022-06-27 RX ADMIN — ALBUTEROL SULFATE 2.5 MG: 2.5 SOLUTION RESPIRATORY (INHALATION) at 06:06

## 2022-06-27 RX ADMIN — ROCURONIUM BROMIDE 15 MG: 10 SOLUTION INTRAVENOUS at 10:06

## 2022-06-27 RX ADMIN — EPHEDRINE SULFATE 5 MG: 50 INJECTION INTRAVENOUS at 08:06

## 2022-06-27 RX ADMIN — LIDOCAINE HYDROCHLORIDE 100 MG: 20 INJECTION, SOLUTION INTRAVENOUS at 07:06

## 2022-06-27 RX ADMIN — DEXMEDETOMIDINE HYDROCHLORIDE 4 MCG: 100 INJECTION, SOLUTION, CONCENTRATE INTRAVENOUS at 11:06

## 2022-06-27 RX ADMIN — ALBUMIN (HUMAN): 12.5 SOLUTION INTRAVENOUS at 09:06

## 2022-06-27 RX ADMIN — ROCURONIUM BROMIDE 15 MG: 10 SOLUTION INTRAVENOUS at 11:06

## 2022-06-27 RX ADMIN — PROPOFOL 100 MCG/KG/MIN: 10 INJECTION, EMULSION INTRAVENOUS at 09:06

## 2022-06-27 NOTE — DISCHARGE INSTRUCTIONS
Remove Scopolamine patch on post op day 2.    Ex (surgery on Mon, remove on Wed)    Wash hands thoroughly after removing patch and wash area where patch was placed.    Fold in half and discard in garbage.       Anesthesia: After Your Surgery  Youve just had surgery. During surgery, you received medication called anesthesia to keep you comfortable and pain-free. After surgery, you may experience some pain or nausea. This is common. Here are some tips for feeling better and recovering after surgery.    Going home  Your doctor or nurse will show you how to take care of yourself when you go home. He or she will also answer your questions. Have an adult family member or friend drive you home. For the first 24 hours after your surgery:  Do not drive or use heavy equipment.  Do not make important decisions or sign legal documents.  Avoid alcohol.  Have someone stay with you, if needed. He or she can watch for problems and help keep you safe.  Take your time getting up from a seated or lying position. You may experience dizziness for 24 hours  Be sure to keep all follow-up appointments with your doctor. And rest after your procedure for as long as your doctor tells you to.    Coping with pain  If you have pain after surgery, pain medication will help you feel better. Take it as directed, before pain becomes severe. Also, ask your doctor or pharmacist about other ways to control pain, such as with heat, ice, and relaxation. And follow any other instructions your surgeon or nurse gives you.    URINARY RETENTION  Should you experience a decrease in your urine output or are unable to urinate following surgery, this can be due to the medications given during surgery.  We recommend you going to the nearest Emergency Department.    Tips for taking pain medication  To get the best relief possible, remember these points:  Pain medications can upset your stomach. Taking them with a little food may help.  Most pain relievers taken  by mouth need at least 20 to 30 minutes to take effect.  Taking medication on a schedule can help you remember to take it. Try to time your medication so that you can take it before beginning an activity, such as dressing, walking, or sitting down for dinner.  Constipation is a common side effect of pain medications. Contact your doctor before taking any medications like laxatives or stool softeners to help relieve constipation. Also ask about any dietary restrictions, because drinking lots of fluids and eating foods like fruits and vegetables that are high in fiber can also help. Remember, dont take laxatives unless your surgeon has prescribed them.  Mixing alcohol and pain medication can cause dizziness and slow your breathing. It can even be fatal. Dont drink alcohol while taking pain medication.  Pain medication can slow your reflexes. Dont drive or operate machinery while taking pain medication.  If your health care provider tells you to take acetaminophen to help relieve your pain, ask him or her how much you are supposed to take each day. (Acetaminophen is the generic name for Tylenol and other brand-name pain relievers.) Acetaminophen or other pain relievers may interact with your prescription medicines or other over-the-counter (OTC) drugs. Some prescription medications contain acetaminophen along with other active ingredients. Using both prescription and OTC acetaminophen for pain can cause you to overdose. The FDA recommends that you read the labels on your OTC medications carefully. This will help you to clearly understand the list of active ingredients, dosing instructions, and any warnings. It may also help you avoid taking too much acetaminophen. If you have questions or don't understand the information, ask your pharmacist or health care provider to explain it to you before you take the OTC medication.    Managing nausea  Some people have an upset stomach after surgery. This is often due to  "anesthesia, pain, pain medications, or the stress of surgery. The following tips will help you manage nausea and get good nutrition as you recover. If you were on a special diet before surgery, ask your doctor if you should follow it during recovery. These tips may help:  Dont push yourself to eat. Your body will tell you when to eat and how much.  Start off with clear liquids and soup. They are easier to digest.  Progress to semi-solid foods (mashed potatoes, applesauce, and gelatin) as you feel ready.  Slowly move to solid foods. Dont eat fatty, rich, or spicy foods at first.  Dont force yourself to have three large meals a day. Instead, eat smaller amounts more often.  Take pain medications with a small amount of solid food, such as crackers or toast to avoid nausea.      Call your surgeon if    You feel too sleepy, dizzy, or groggy (medication may be too strong).  You have side effects like nausea, vomiting, or skin changes (rash, itching, or hives).   © 3458-8610 [employer]. [address] [place] [address]. All rights reserved. This information is not intended as a substitute for professional medical care. Always follow your healthcare professional's instructions.                           Facelift Surgery Post-Op Instructions        Below are post-operative instructions to assist with your recovery. If you experience any of the following, call us IMMMEDIATELY:   ? temperature of 101°F or greater   ? any redness spreading away from incision site   ? any unusual, painful swelling near incision site   ? any active bleeding that saturates more than a 4"x4" gauze   ? any pus drainage that is green or yellow in color   ? pain not relieved by your prescribed pain medication         Activity     You may wake up after surgery with thick white stockings on. Wear them for 2-3 days or until you are walking around more.   For the first 2 weeks, sleep on your back with the head of the bead " elevated, sleep with your head on 2-3 pillows or sleep in a recliner. This will help with swelling.   Apply cold compresses (washcloths or cotton gauze soaked in ice water) to your cheeks and eyes for 20 minutes every 2 hours for the first 48 hours. This helps in minimizing swelling and bruising.   DO NOT lie on either side.   DO NOT twist or turn your neck.   DO NOT wear any clothing that fits snuggly over your head.   DO NOT wear heavy earrings that dangle and pull down your earlobes.   Avoid any excessive facial movements such as laughing, smiling, chewing, talking or yawning for the FIRST 7 days.      Avoid all heavy lifting, straining or bending for 2 weeks after surgery.   Avoid any sexual activity for 2 weeks after surgery.   Avoid any strenuous activity for 4-6 weeks, including semi-contact sports or vigorous exercising.   Do not operate a motor vehicle until you can safely turn your head without putting a strain on your incision, usually 3-4 weeks after surgery. Your neck and shoulders should turn as one.   Do not operate a motor vehicle within 24 hours of taking pain medication.     Dressings     KEEP YOUR DRESSINGS AND INCISIONS DRY. Approximately 1 to 3 days after surgery your dressing will be removed in the office and new dressings will be applied by his nurse. Your sutures/staples will be removed approximately 1 week after surgery.   DO NOT bump, stretch, or rub your eyes for the first 3-4 weeks. Temporary numbness around your eyes, ears and neck is normal. Itching near the ear incision is also normal.   You may wash your face lightly 2 to 3 days after surgery using mild soap and water and pat dry.   You may wash your hair lightly 5 days after surgery using a mild shampoo and water.   Schedule an appointment to have your hair washed the day of your second post-op appointment at the [facility] ([phone]). They are familiar with Dr. Elliott's patients and know how to do this properly.   Do  not dye, tint or bleach your hair until after you have spoken with your doctor. This is usually about 4 weeks after the surgery.   Do not use a curling iron or blow dryer on the numb areas of your scalp. You could burn yourself and not realize it.   Do not use makeup for the first 7 days. Afterwards avoid applying along the suture line.     Medications     It is important you understand the medication list your nurse gave you before surgery. It contains those medications you do not take 14 days before and after surgery.   You will get a prescription for an antibiotic, a pain medication, and an anti-nausea medication. Start your antibiotics when you get home and take them as instructed until they are all gone. It is best to take them with food to prevent an upset stomach. If your antibiotic gives you diarrhea, take a probiotic such as Lactinex to help.   Avoid taking aspirin, Motrin, Advil, and Vitamin E for 2 weeks after surgery. There are many others.   Pain medication may cause constipation. If you have NOT had a bowel movement 3-5 days after surgery, take Colace (an over-the-counter stool softener). If this does not help, try Senokot or Miralax. Call the nurse if you have any issues.     Diet     Avoid foods such as Mexican, Chinese, Seafood, or salty soups for 1 week after surgery. Begin with bland foods the day after surgery and gradually return to your regular diet as you are ready.   Avoid hard or chewy foods.   Drink plenty of fluids.

## 2022-06-27 NOTE — H&P
OTOLARYNGOLOGY- FACIAL PLASTIC & RECONSTRUCTIVE SURGERY       Nenita Denis  [id number]     CC: nasal trauma     HISTORY OF PRESENT ILLNESS: Nenita Denis  is a 62 y.o. female who was referred to me by Dr. David Wren in consultation for nasal obstruction.  Nenita reports a 1 month history of difficulty breathing on left and right side. The obstruction is worse on the left side  The obstruction is constant.  This began after a fall from standing.  She tripped while taking the garbage out.  After her injury, she reports epistaxis an abrasion on the nasal dorsum.  She denies loss of consciousness.  She was seen in emergency room where x-rays were performed revealing nasal bone fracture.  Her swelling has slowly improved and she has noticed some external deviation of the nose as well as dorsal deformity.  The nasal obstruction has been constant and worse on the left side. There is not a history of nasal allergies/chronic sinus disease.         She also reports some aging facial changes which have been bothersome over the past few years.  She has had some fluctuation of her weight and noticed more skin laxity after weight loss.  She also had Ki Rossi injected 1-2 years ago which did not seem to the improve her submental region but may be added increased skin laxity.  She reports she has always had some fullness of her submental region even at a younger age.     SNOT-22: 40  NOSE: 70%     Today (05/05/2022): Patient returns for follow up visit. Patient reports improvement in nasal swelling.  Continued nasal obstruction bilaterally.  She does occasionally smoke cigarettes.       Past Medical History  She has a past medical history of GERD (gastroesophageal reflux disease), Hypertension, and Plantar fasciitis.     Past Surgical History  She has a past surgical history that includes TONSILLECTOMY, ADENOIDECTOMY.     Family History  Her family history includes Breast cancer (age of onset: 42) in her mother; Colon cancer  in her father; No Known Problems in her brother and sister.     Social History  She reports that she has been smoking cigarettes. She has a 5.00 pack-year smoking history. She has never used smokeless tobacco. She reports current alcohol use. She reports that she does not use drugs.     Allergies  She has No Known Allergies.     Medications  She has a current medication list which includes the following prescription(s): b complex vitamins, fluticasone propionate, hydrochlorothiazide, magnesium, multivitamin, omega-3 fatty acids-vitamin e, and probiotic formula (inulin).        Review of Systems   Answers for HPI/ROS submitted by the patient on 4/29/2022  None of these: Yes  sinus pressure : Yes  None of these : Yes  None of these: Yes  None of these : Yes  heartburn: Yes  None of these: Yes  back pain: Yes  None of these : Yes  None of these: Yes  None of these : Yes  None of these: Yes  None of these: Yes  None of these: Yes              PHYSICAL EXAM:  /74 (BP Location: Right arm, Patient Position: Sitting, BP Method: Medium (Automatic))   Pulse 80   Temp 97 °F (36.1 °C) (Temporal)   General: Alert and oriented in no acute distress  Head and Face: Normocephaic, atruamatic  Neurological: Cranial nerves II-XII are grossly intact  Skin: Skin texture is medium thickness rated as Cristina II  Eyes:   EOMI, sclera clear, PERRL  Ears: Pinna are normal in shape and position  EACs clear bilaterally  TMs clear without YUDELKA or perforation bilaterally  Nose: Nasal skin is medium thickness    On frontal view the dorsum is Irregular, slight reverse C-shape- slight shift of nasal bones to the left with lower 3rd deviation to the right and the nose is proportional compared to the facial features.  The brow-tip aesthetic line is disrupted bilaterally .  Nasal base width is proportional compared to intercanthal distance.   On base view the tip is trapezoidal in shape.  The caudal septum is midline  On profile view the  dorsum is Convex with dorsal irregularity with normal projection.  Tip projection is normal and rotation is normal.  Tip support is strong to palpation.  Columellar show is normal. Alar retraction is not present bilaterally. Hanging columella is Absent.     Anterior rhinoscopy reveals the septum is significantly deviated to the left inferiorly without perforation or synechiae.  The turbinates are mildly hypertrophic with edematous mucosa  bilaterally.  There are not pathological secretions present.      The external nasal valve is normal and patent  on the right and normal and patent  on the left without dynamic collapse  The internal nasal valve is narrow on the right and narrow on the left.  Modified Rosy maneuver on the right does improve breathing in the  position only  Modified Rosy maneuver on the left does improve breathing in the  position only     Oral cavity and Oropharynx: Mucous membranes moist without lesions present.  Tongue protrudes midline and palate elevates midline.  Neck: Supple without LAD.    Lungs:breathing comfortably  Psychiatric:  Mood and affect are normal           ASSESSMENT:   1. Nasal obstruction    2. Injury of nose, sequela    3. Nasal valve collapse    4. Hypertrophy of inferior nasal turbinate    5. Rhytidosis facialis    6. Deviated nasal septum              PLAN:      I had a long discussion with the patient regarding her condition and the further workup and management options.      Nenita's nasal obstruction is related to deviated nasal septum, nasal valve collapse, inferior turbinate hypertrophy and significantly impacts her quality of life.  She also has dorsal irregularity as a result of her recent trauma.      We discussed the options for repair including septoplasty, nasal valve repair, submucosal resection of the inferior turbinates.  This would require an open approach.  Discussed waiting between 3-6 months from traumatic event before proceeding with  open septorhinoplasty.  Nenita is also interested in cosmetic surgery to address her cervicofacial rhytidosis.  We discussed what can be accomplished with a face/neck lift.  We discussed the risks of face and neck lift and submental liposuction as well as functional septorhinoplasty, SMRT.  The risks include but are not limited to bleeding, scarring, infection, septal perforation, asymmetry, continued nasal obstruction, hematoma, poor wound healing, facial numbness, facial weakness, poor cosmetic result.  Will we discussed postoperative course the timeframe for healing.  Patient voices understanding wished proceed with surgery. Surgery scheduled 6/27/22. All questions answered patient was encouraged call with any questions or concerns.  We also discussed complete cessation of cigarette smoking and avoidance of secondhand smoke 1 month prior in 1 month after surgery.     OP: SMRT, septoplasty, open, dorsal rasp, right  graft, columellar strut, suture left medial footplate, possible SMAS soft tissue dorsal onlay, SML, face/necklift

## 2022-06-27 NOTE — ANESTHESIA POSTPROCEDURE EVALUATION
Anesthesia Post Evaluation    Patient: Nenita Denis    Procedure(s) Performed: Procedure(s) (LRB):  EXCISION, NASAL TURBINATE, SUBMUCOSAL (Bilateral)  SEPTOPLASTY, NOSE (N/A)  REPAIR, STENOSIS, NOSE, VESTIBULE (N/A)  RHYTIDECTOMY (FACE/NECK LIFT, SUBMENTAL LIPOSUCTION) (N/A)    Final Anesthesia Type: general      Patient location during evaluation: PACU  Patient participation: Yes- Able to Participate  Level of consciousness: awake and alert  Post-procedure vital signs: reviewed and stable  Pain management: adequate  Airway patency: patent  DAGOBERTO mitigation strategies: Extubation while patient is awake  PONV status at discharge: No PONV  Anesthetic complications: no      Cardiovascular status: hemodynamically stable  Respiratory status: unassisted  Hydration status: euvolemic  Follow-up not needed.          Vitals Value Taken Time   /64 06/27/22 1745   Temp 36.9 °C (98.5 °F) 06/27/22 1715   Pulse 95 06/27/22 1757   Resp 17 06/27/22 1730   SpO2 92 % 06/27/22 1757   Vitals shown include unvalidated device data.      Event Time   Out of Recovery 17:58:58         Pain/Manisha Score: Pain Rating Prior to Med Admin: 2 (6/27/2022  6:24 AM)  Manisha Score: 10 (6/27/2022  5:45 PM)

## 2022-06-27 NOTE — ANESTHESIA PROCEDURE NOTES
Intubation    Date/Time: 6/27/2022 7:19 AM  Performed by: Abiel Loomis CRNA  Authorized by: Dax Blount MD     Intubation:     Induction:  Intravenous    Intubated:  Postinduction    Mask Ventilation:  Easy with oral airway    Attempts:  1    Attempted By:  CRNA    Method of Intubation:  Video laryngoscopy    Blade:  Lira 3    Laryngeal View Grade: Grade I - full view of cords      Difficult Airway Encountered?: No      Complications:  None    Airway Device:  Oral crystal    Airway Device Size:  7.0    Style/Cuff Inflation:  Cuffed (inflated to minimal occlusive pressure)    Tube secured:  21    Secured at:  The lips    Placement Verified By:  Capnometry    Complicating Factors:  None    Findings Post-Intubation:  BS equal bilateral and atraumatic/condition of teeth unchanged

## 2022-06-27 NOTE — TRANSFER OF CARE
"Anesthesia Transfer of Care Note    Patient: Nenita Denis    Procedure(s) Performed: Procedure(s) (LRB):  EXCISION, NASAL TURBINATE, SUBMUCOSAL (Bilateral)  SEPTOPLASTY, NOSE (N/A)  REPAIR, STENOSIS, NOSE, VESTIBULE (N/A)  RHYTIDECTOMY (FACE/NECK LIFT, SUBMENTAL LIPOSUCTION) (N/A)    Patient location: PACU    Anesthesia Type: general    Transport from OR: Transported from OR on 6-10 L/min O2 by face mask with adequate spontaneous ventilation    Post pain: adequate analgesia    Post assessment: no apparent anesthetic complications    Post vital signs: stable    Level of consciousness: sedated and responds to stimulation    Nausea/Vomiting: no nausea/vomiting    Complications: none    Transfer of care protocol was followed      Last vitals:   Visit Vitals  /63 (BP Location: Right arm, Patient Position: Lying)   Pulse 105   Temp 37.8 °C (100 °F) (Axillary)   Resp 16   Ht 5' 3" (1.6 m)   Wt 77.1 kg (170 lb)   SpO2 95%   Breastfeeding No   BMI 30.11 kg/m²     "

## 2022-06-27 NOTE — OP NOTE
Service: Facial Plastic and Reconstructive Surgery    Patient: Nenita Denis    MRN: [id number]     Date: 06/27/2022     Preoperative Diagnosis:   1. Nasal obstruction  2. Deviated nasal septum  3. Nasal valve collapse  4. Inferior turbinate hypertrophy     Postoperative Diagnosis:   1. Nasal obstruction  2. Deviated nasal septum  3. Nasal valve collapse  4. Inferior turbinate hypertrophy     Surgeon: Keegan Davis MD     Resident Surgeon: Monika Troncoso MD     Procedures:   1. Repair of nasal valves - CPT 50452  2. Septoplasty - CPT 31323  3. Bilateral inferior turbinate reduction - CPT 47937-42  4. Septal cartilage graft, CPT 42615  5. Face/neck lift, submental liposuction     Anesthesia: General endotracheal anesthesia       Complications: none     Blood loss: 30 ml     Findings:   Severe septal deviation to the left   Bilateral  internal valve collapse  Bilateral  inferior turbinate hypertrophy   depressed right nasal bone deformity     Specimen: none      Retained items:    Bilateral Khan splints secured with 3-0 nylon suture     Grafts:  bilateral  grafts - septal cartilage  Dorsal onlay graft - diced cartilage glue-graft  Columellar strut - septal cartilage     Indications: Nenita Denis is a pleasant 62 y.o. female who presented with a long history of nasal obstruction. she did have a prior history of nasal trauma, and she did not have a prior septoplasty.  Despite medical management, she has had continued nasal obstruction.  After exam and discussion with the patient, we discussed the risks, benefits and alternatives of the above stated the procedure. She also had some age-related facial changes that she wanted to see addressed. she wished to proceed with the aforementioned procedure; written consent was obtained.     Operation:  The patient was properly identified in the holding area where informed written consent was obtained. The patient was brought back to the operating suite and  placed supine on the operating room table.  The patient was intubated orally without difficulty. The bed was rotated 90 degrees. A timeout was performed.    The facial and neck soft tissue planes were injected with a total of 100 cc of 0.25% lidocaine with [id number] of epinephrine.  Adequate time was allowed for the anesthetic to take effect. We 1st turned our attention to the cosmetic face and neck lift.  We began in the submental region.  The 15 blade was used to make an incision just below the submental crease.  This was carried through skin and subcutaneous tissue.  The 4 mm liposuction cannula was then used to perform the submental liposuction.  The pre-platysmal as well fat was addressed with this.  Platysma plasty was then performed next.  The central bands of the his mum were excised with the face-lift scissors.  It is a conservative amount of subplatysmal fat was then excised.  The bipolar cautery was used to further shrink down the adipose tissue in this region.  Plication the platysma was then performed with 2-0 Vicryl sutures.  Further subcutaneous tunneling was then performed laterally to allow for re draping of the neck skin.  The incision was then closed with 5-0 Monocryl deep dermal sutures.  The skin was closed with a 6-0 Prolene running locking suture.  Then turned attention to the right side of the face.  It temporal hair tuft incision was then made on the right side extending pre rigidly then post tragal then infra lobular extending postauricularly into the hairline.  The skin was elevated in a subcutaneous plane.  The skin flap was elevated and connected to the central submental flap elevation.  Once the subcutaneous flaps were elevated, the sub-SMAS plane was then entered.  Careful dissection with face of scissors was performed in a sub spine as plane until mobilization of the midface and jowl was achieved.  This  flap SMAS flap was then elevated and secured with 3-0 and 5-0 PDS suture.  The  platysma was then directed further posteriorly and elevated and secured with 2-0 silk suture.  This significantly to improve the jawline and cleaned up the gel in the face region without tightening of the oral commissure.  Hemostasis was achieved with the bipolar electrocautery.  The skin flaps were then draped into place and trimmed carefully with the face of scissors.  The skin flaps were then elevated again artist tissue sealant was sprayed into the dissection this completed the cosmetic portion of the case.  bed.  The flaps were then laid back down and held into place for 3 minutes for the tissue sealant to adhere.  The scalp incisions were closed with staples.  The infra lobular incision was closed with a 5-0 deep dermal Monocryl suture.  The pre Mau alert incisions closed with 6-0 Prolene suture.  The post tragal incision was closed with 5-0 fast gut suture.  The postauricular incision was closed with 5-0 chromic suture.  We then turned our attention to the left side where a similar procedure was performed for symmetry.  There were no deviations or complications.  Once complete the patient was cleaned.  Bacitracin was placed over the incisions.  Telfa and fluffs were placed over the dissection bed in a soft compression wrap was then applied.  This completed the cosmetic portion of the case.    We then turned our attention to the nasal portion of the case.  The bilateral nares were decongested with cocaine-soaked pledgettes.  The septum, columella, nasal dorsum and vestibule were infiltrated with a total of 9 cc of 1% lidocaine with epinephrine 1:100,000.  The patient was prepped and draped in the normal sterile fashion.      The zero degree endoscope was then used to perform the inferior turbinate reduction. A small incision on the head of the inferior turbinate was made with the 15 blade bilaterally. The caudal elevated was used to elevated along the inferior turbinate bones. The suction microdebrider was  then used to perform bilateral submucosal resection of the inferior turbinates. The ca elevator was then used to outfracture the inferior turbinates bilaterally.      We first turned our attention to the septum. A hemifransfixion incision was made on the left side. Mucoperichondrial flap was elevated on the left side. The septum was noted to deviated to the left with a spur on the left. The bony-cartilaginous junction was stippled while preserving the keystone area. Deviated bony septum was removed with double-action scissors and Sukumar forceps. The bony septum was saved in saline. A 1.5 L strut was kept intact, and a 15 blade was used to harvest our septal graft.  This was removed and placed in saline on the back table. The caudal septum was straight and remained secure to the midline of the spine.  The septal flaps were redraped and the septum was noted to be straight. A drainage port was created on the left side.    We then turned our attention to the nasal valve portion of the case.  The 11 blade was used to make an inverted-V incision across the columella. This was connected bilaterally to marginal incisions.  The soft tissue envelope was elevated and the nasal bone periosteum was identified. This was divided with the Isabela elevator and lifted to expose the nasal bones.  Bony irregularity along the dorsum was then smoothed with a rasp.  A defect was noted on the right nasal bone which was noted preoperatively.  A diced cartilage wound graft was created using the artiss.  This was placed over the nasal bone defect and significantly improved the regularity.     The septal cartilage was then carved to create bilateral  grafts for the internal nasal valves. These were secured with 5-0 PDS sutures in a horizontal mattress fashion between the septum and the upper lateral cartilages. This straightened the middle third of the nose and improved the internal nasal valve.  The medial footplates were then  dissected.  A columellar strut graft was carved from septal cartilage in place between the footplates.  This helped to maintain tip projection rotation improved tip support.  The asymmetric medial footplates were addressed with 5-0 PDS sutures securing to the columellar strut.    The columellar incision was then closed with 5-0 fast-gut suture in a simple, interrupted fashion. The marginal incision was closed with 5-0 chromic suture. The hemitransfixion incision was closed with 5-0 chromic suture. A coapting 4-0 chromic suture was placed to re-approximate the septal flaps.      Bilateral lawrence splints were placed and secured with 3-0 nylon suture. The patient was then cleaned and mastisol, steri-strips and the nasal aquaplast splint were placed. An OG tube was used to empty the gastric contents.  The patient was turned back to anesthesia where she was awaked and extubated without complications. she was transported to the PACU in good condition.    I was present and participatory for the entire procedure.

## 2022-06-28 ENCOUNTER — OFFICE VISIT (OUTPATIENT)
Dept: OTOLARYNGOLOGY | Facility: CLINIC | Age: 62
End: 2022-06-28
Payer: COMMERCIAL

## 2022-06-28 VITALS
BODY MASS INDEX: 30.12 KG/M2 | WEIGHT: 170 LBS | SYSTOLIC BLOOD PRESSURE: 132 MMHG | HEIGHT: 63 IN | HEART RATE: 85 BPM | DIASTOLIC BLOOD PRESSURE: 80 MMHG | RESPIRATION RATE: 15 BRPM | OXYGEN SATURATION: 95 % | TEMPERATURE: 98 F

## 2022-06-28 VITALS
HEART RATE: 73 BPM | SYSTOLIC BLOOD PRESSURE: 122 MMHG | TEMPERATURE: 98 F | DIASTOLIC BLOOD PRESSURE: 72 MMHG | HEIGHT: 63 IN | BODY MASS INDEX: 30.12 KG/M2 | WEIGHT: 170 LBS

## 2022-06-28 DIAGNOSIS — J34.89 NASAL OBSTRUCTION: Primary | ICD-10-CM

## 2022-06-28 DIAGNOSIS — M95.0 NASAL VALVE COLLAPSE: ICD-10-CM

## 2022-06-28 DIAGNOSIS — S09.92XS INJURY OF NOSE, SEQUELA: ICD-10-CM

## 2022-06-28 DIAGNOSIS — J34.3 HYPERTROPHY OF INFERIOR NASAL TURBINATE: ICD-10-CM

## 2022-06-28 DIAGNOSIS — L98.8 RHYTIDOSIS FACIALIS: ICD-10-CM

## 2022-06-28 PROCEDURE — 3074F SYST BP LT 130 MM HG: CPT | Mod: CPTII,S$GLB,, | Performed by: OTOLARYNGOLOGY

## 2022-06-28 PROCEDURE — 1159F PR MEDICATION LIST DOCUMENTED IN MEDICAL RECORD: ICD-10-PCS | Mod: CPTII,S$GLB,, | Performed by: OTOLARYNGOLOGY

## 2022-06-28 PROCEDURE — 3078F DIAST BP <80 MM HG: CPT | Mod: CPTII,S$GLB,, | Performed by: OTOLARYNGOLOGY

## 2022-06-28 PROCEDURE — 1159F MED LIST DOCD IN RCRD: CPT | Mod: CPTII,S$GLB,, | Performed by: OTOLARYNGOLOGY

## 2022-06-28 PROCEDURE — 1160F RVW MEDS BY RX/DR IN RCRD: CPT | Mod: CPTII,S$GLB,, | Performed by: OTOLARYNGOLOGY

## 2022-06-28 PROCEDURE — 1160F PR REVIEW ALL MEDS BY PRESCRIBER/CLIN PHARMACIST DOCUMENTED: ICD-10-PCS | Mod: CPTII,S$GLB,, | Performed by: OTOLARYNGOLOGY

## 2022-06-28 PROCEDURE — 3008F PR BODY MASS INDEX (BMI) DOCUMENTED: ICD-10-PCS | Mod: CPTII,S$GLB,, | Performed by: OTOLARYNGOLOGY

## 2022-06-28 PROCEDURE — 99024 POSTOP FOLLOW-UP VISIT: CPT | Mod: S$GLB,,, | Performed by: OTOLARYNGOLOGY

## 2022-06-28 PROCEDURE — 3074F PR MOST RECENT SYSTOLIC BLOOD PRESSURE < 130 MM HG: ICD-10-PCS | Mod: CPTII,S$GLB,, | Performed by: OTOLARYNGOLOGY

## 2022-06-28 PROCEDURE — 3078F PR MOST RECENT DIASTOLIC BLOOD PRESSURE < 80 MM HG: ICD-10-PCS | Mod: CPTII,S$GLB,, | Performed by: OTOLARYNGOLOGY

## 2022-06-28 PROCEDURE — 99024 PR POST-OP FOLLOW-UP VISIT: ICD-10-PCS | Mod: S$GLB,,, | Performed by: OTOLARYNGOLOGY

## 2022-06-28 PROCEDURE — 3008F BODY MASS INDEX DOCD: CPT | Mod: CPTII,S$GLB,, | Performed by: OTOLARYNGOLOGY

## 2022-06-28 NOTE — PROGRESS NOTES
"  Subjective:     Chief Complaint:   Chief Complaint   Patient presents with    Other     Remove packing bandage    Post-op Evaluation     Remove bandages       Nenita Denis is a 62 y.o. female who present for postoperative visit.     Patient underwent septoplasty, nasal valve repair, SMRT, facelift on 6/27/22.   Since then, she has been doing well without complaints.     Past Medical History  She has a past medical history of GERD (gastroesophageal reflux disease), Hypertension, Plantar fasciitis, and PONV (postoperative nausea and vomiting).    Past Surgical History  She has a past surgical history that includes TONSILLECTOMY, ADENOIDECTOMY; Laparoscopic cholecystectomy; Carpal tunnel release; Release of ulnar nerve at cubital tunnel (Right); Nasal septoplasty (N/A, 6/27/2022); Nasal stenosis repair (N/A, 6/27/2022); and Brow lift (N/A, 6/27/2022).    Family History  Her family history includes Breast cancer (age of onset: 42) in her mother; Colon cancer in her father; No Known Problems in her brother and sister.    Social History  She reports that she has been smoking cigarettes. She has a 5.00 pack-year smoking history. She has never used smokeless tobacco. She reports current alcohol use. She reports that she does not use drugs.    Allergies  She has No Known Allergies.    Medications  She has a current medication list which includes the following prescription(s): amoxicillin-clavulanate 500-125mg, b complex vitamins, bacitracin, clonazepam, fluticasone propionate, hydrocodone-acetaminophen, multivitamin, omega-3 fatty acids-vitamin e, ondansetron, probiotic formula (inulin), and sodium chloride.       Objective:     /72   Pulse 73   Temp 98.2 °F (36.8 °C)   Ht 5' 3" (1.6 m)   Wt 77.1 kg (170 lb)   BMI 30.11 kg/m²      Constitutional:   Vital signs are normal. She appears well-developed and well-nourished.     Head:          Nose:            Procedure    None           Assessment:     1. " Nasal obstruction    2. Injury of nose, sequela    3. Nasal valve collapse    4. Hypertrophy of inferior nasal turbinate    5. Rhytidosis facialis          Plan:     Patient is doing well postoperatively. We discussed postop instructions including nasal saline q4 hours while awake, bacitracin twice a day to incisions and within nostrils. No nose blowing x 4 weeks. Discussed postop activity limitations. Follow up this Friday, sooner if needed. Wear compression wrap as often as possible.

## 2022-06-28 NOTE — PLAN OF CARE
Gloria Adeel has met all discharge criteria from Phase II. Vital Signs are stable, ambulating  without difficulty. Discharge instructions given, patient verbalized understanding. Discharged from facility via wheelchair in stable condition.

## 2022-06-28 NOTE — DISCHARGE SUMMARY
[facility]  Discharge Note  Short Stay    Procedure(s) (LRB):  EXCISION, NASAL TURBINATE, SUBMUCOSAL (Bilateral)  SEPTOPLASTY, NOSE (N/A)  REPAIR, STENOSIS, NOSE, VESTIBULE (N/A)  RHYTIDECTOMY (FACE/NECK LIFT, SUBMENTAL LIPOSUCTION) (N/A)    OUTCOME: Patient tolerated treatment/procedure well without complication and is now ready for discharge.    DISPOSITION: Home or Self Care    FINAL DIAGNOSIS:  <principal problem not specified>    FOLLOWUP: In clinic    DISCHARGE INSTRUCTIONS:    Discharge Procedure Orders   Diet Adult Regular     Other restrictions (specify):   Order Comments: No heavy lifting or straining, nothing greater than 10 pounds for 2 weeks  No nose blowing  Sneeze with mouth open  Avoid contact sports/activities    Hold Vitamin E medicine after surgery     Notify your health care provider if you experience any of the following:  temperature >100.4     Notify your health care provider if you experience any of the following:  severe uncontrolled pain     Notify your health care provider if you experience any of the following:  redness, tenderness, or signs of infection (pain, swelling, redness, odor or green/yellow discharge around incision site)     Leave dressing on - Keep it clean, dry, and intact until clinic visit   Order Comments: Head wrap dressing will be removed at follow up appointment 6/28   Once removed, OK to shower but keep all incisions and rhinoplasty cast clean and dry  Rhinoplasty cast and nasal packing to be removed at follow up in 1 week  Apply bacitracin ointment to all incision sites including inside nares with a q-tip twice daily

## 2022-07-01 ENCOUNTER — OFFICE VISIT (OUTPATIENT)
Dept: OTOLARYNGOLOGY | Facility: CLINIC | Age: 62
End: 2022-07-01
Payer: COMMERCIAL

## 2022-07-01 VITALS — HEART RATE: 67 BPM | DIASTOLIC BLOOD PRESSURE: 72 MMHG | TEMPERATURE: 97 F | SYSTOLIC BLOOD PRESSURE: 117 MMHG

## 2022-07-01 DIAGNOSIS — J34.3 HYPERTROPHY OF INFERIOR NASAL TURBINATE: ICD-10-CM

## 2022-07-01 DIAGNOSIS — L98.8 RHYTIDOSIS FACIALIS: ICD-10-CM

## 2022-07-01 DIAGNOSIS — S09.92XS INJURY OF NOSE, SEQUELA: ICD-10-CM

## 2022-07-01 DIAGNOSIS — J34.89 NASAL OBSTRUCTION: Primary | ICD-10-CM

## 2022-07-01 DIAGNOSIS — M95.0 NASAL VALVE COLLAPSE: ICD-10-CM

## 2022-07-01 PROCEDURE — 3074F PR MOST RECENT SYSTOLIC BLOOD PRESSURE < 130 MM HG: ICD-10-PCS | Mod: CPTII,S$GLB,, | Performed by: OTOLARYNGOLOGY

## 2022-07-01 PROCEDURE — 99024 POSTOP FOLLOW-UP VISIT: CPT | Mod: S$GLB,,, | Performed by: OTOLARYNGOLOGY

## 2022-07-01 PROCEDURE — 1159F MED LIST DOCD IN RCRD: CPT | Mod: CPTII,S$GLB,, | Performed by: OTOLARYNGOLOGY

## 2022-07-01 PROCEDURE — 1160F PR REVIEW ALL MEDS BY PRESCRIBER/CLIN PHARMACIST DOCUMENTED: ICD-10-PCS | Mod: CPTII,S$GLB,, | Performed by: OTOLARYNGOLOGY

## 2022-07-01 PROCEDURE — 3078F PR MOST RECENT DIASTOLIC BLOOD PRESSURE < 80 MM HG: ICD-10-PCS | Mod: CPTII,S$GLB,, | Performed by: OTOLARYNGOLOGY

## 2022-07-01 PROCEDURE — 3078F DIAST BP <80 MM HG: CPT | Mod: CPTII,S$GLB,, | Performed by: OTOLARYNGOLOGY

## 2022-07-01 PROCEDURE — 1159F PR MEDICATION LIST DOCUMENTED IN MEDICAL RECORD: ICD-10-PCS | Mod: CPTII,S$GLB,, | Performed by: OTOLARYNGOLOGY

## 2022-07-01 PROCEDURE — 1160F RVW MEDS BY RX/DR IN RCRD: CPT | Mod: CPTII,S$GLB,, | Performed by: OTOLARYNGOLOGY

## 2022-07-01 PROCEDURE — 99024 PR POST-OP FOLLOW-UP VISIT: ICD-10-PCS | Mod: S$GLB,,, | Performed by: OTOLARYNGOLOGY

## 2022-07-01 PROCEDURE — 3074F SYST BP LT 130 MM HG: CPT | Mod: CPTII,S$GLB,, | Performed by: OTOLARYNGOLOGY

## 2022-07-05 ENCOUNTER — OFFICE VISIT (OUTPATIENT)
Dept: OTOLARYNGOLOGY | Facility: CLINIC | Age: 62
End: 2022-07-05
Payer: COMMERCIAL

## 2022-07-05 VITALS
WEIGHT: 172.31 LBS | TEMPERATURE: 98 F | RESPIRATION RATE: 16 BRPM | OXYGEN SATURATION: 98 % | BODY MASS INDEX: 30.53 KG/M2 | DIASTOLIC BLOOD PRESSURE: 81 MMHG | HEART RATE: 88 BPM | HEIGHT: 63 IN | SYSTOLIC BLOOD PRESSURE: 137 MMHG

## 2022-07-05 DIAGNOSIS — M95.0 NASAL VALVE COLLAPSE: ICD-10-CM

## 2022-07-05 DIAGNOSIS — J34.3 HYPERTROPHY OF INFERIOR NASAL TURBINATE: ICD-10-CM

## 2022-07-05 DIAGNOSIS — J34.89 NASAL OBSTRUCTION: Primary | ICD-10-CM

## 2022-07-05 DIAGNOSIS — S09.92XS INJURY OF NOSE, SEQUELA: ICD-10-CM

## 2022-07-05 DIAGNOSIS — L98.8 RHYTIDOSIS FACIALIS: ICD-10-CM

## 2022-07-05 PROCEDURE — 1159F PR MEDICATION LIST DOCUMENTED IN MEDICAL RECORD: ICD-10-PCS | Mod: CPTII,S$GLB,, | Performed by: OTOLARYNGOLOGY

## 2022-07-05 PROCEDURE — 1160F RVW MEDS BY RX/DR IN RCRD: CPT | Mod: CPTII,S$GLB,, | Performed by: OTOLARYNGOLOGY

## 2022-07-05 PROCEDURE — 3008F PR BODY MASS INDEX (BMI) DOCUMENTED: ICD-10-PCS | Mod: CPTII,S$GLB,, | Performed by: OTOLARYNGOLOGY

## 2022-07-05 PROCEDURE — 1159F MED LIST DOCD IN RCRD: CPT | Mod: CPTII,S$GLB,, | Performed by: OTOLARYNGOLOGY

## 2022-07-05 PROCEDURE — 99024 POSTOP FOLLOW-UP VISIT: CPT | Mod: S$GLB,,, | Performed by: OTOLARYNGOLOGY

## 2022-07-05 PROCEDURE — 3079F DIAST BP 80-89 MM HG: CPT | Mod: CPTII,S$GLB,, | Performed by: OTOLARYNGOLOGY

## 2022-07-05 PROCEDURE — 3008F BODY MASS INDEX DOCD: CPT | Mod: CPTII,S$GLB,, | Performed by: OTOLARYNGOLOGY

## 2022-07-05 PROCEDURE — 3075F SYST BP GE 130 - 139MM HG: CPT | Mod: CPTII,S$GLB,, | Performed by: OTOLARYNGOLOGY

## 2022-07-05 PROCEDURE — 3075F PR MOST RECENT SYSTOLIC BLOOD PRESS GE 130-139MM HG: ICD-10-PCS | Mod: CPTII,S$GLB,, | Performed by: OTOLARYNGOLOGY

## 2022-07-05 PROCEDURE — 99024 PR POST-OP FOLLOW-UP VISIT: ICD-10-PCS | Mod: S$GLB,,, | Performed by: OTOLARYNGOLOGY

## 2022-07-05 PROCEDURE — 3079F PR MOST RECENT DIASTOLIC BLOOD PRESSURE 80-89 MM HG: ICD-10-PCS | Mod: CPTII,S$GLB,, | Performed by: OTOLARYNGOLOGY

## 2022-07-05 PROCEDURE — 1160F PR REVIEW ALL MEDS BY PRESCRIBER/CLIN PHARMACIST DOCUMENTED: ICD-10-PCS | Mod: CPTII,S$GLB,, | Performed by: OTOLARYNGOLOGY

## 2022-07-05 NOTE — PROGRESS NOTES
"  Subjective:     Chief Complaint:   Chief Complaint   Patient presents with    Post-op Evaluation       Nenita Denis is a 62 y.o. female who present for postoperative visit.     Patient underwent septoplasty, nasal valve repair, SMRT, facelift on 6/27/22.   Since then, she has been doing well without complaints.     Past Medical History  She has a past medical history of GERD (gastroesophageal reflux disease), Hypertension, Plantar fasciitis, and PONV (postoperative nausea and vomiting).    Past Surgical History  She has a past surgical history that includes TONSILLECTOMY, ADENOIDECTOMY; Laparoscopic cholecystectomy; Carpal tunnel release; Release of ulnar nerve at cubital tunnel (Right); Nasal septoplasty (N/A, 6/27/2022); Nasal stenosis repair (N/A, 6/27/2022); and Brow lift (N/A, 6/27/2022).    Family History  Her family history includes Breast cancer (age of onset: 42) in her mother; Colon cancer in her father; No Known Problems in her brother and sister.    Social History  She reports that she has been smoking cigarettes. She has a 5.00 pack-year smoking history. She has never used smokeless tobacco. She reports current alcohol use. She reports that she does not use drugs.    Allergies  She has No Known Allergies.    Medications  She has a current medication list which includes the following prescription(s): b complex vitamins, bacitracin, clonazepam, fluticasone propionate, hydrocodone-acetaminophen, multivitamin, omega-3 fatty acids-vitamin e, ondansetron, probiotic formula (inulin), and sodium chloride.       Objective:     /81 (BP Location: Right arm, Patient Position: Sitting, BP Method: Medium (Automatic))   Pulse 88   Temp 98.1 °F (36.7 °C) (Temporal)   Resp 16   Ht 5' 3" (1.6 m)   Wt 78.2 kg (172 lb 4.8 oz)   SpO2 98%   BMI 30.52 kg/m²      Constitutional:   Vital signs are normal. She appears well-developed and well-nourished.     Head:          Nose:        sutures removed, " incisions healing well    Procedure    None           Assessment:     1. Nasal obstruction    2. Nasal valve collapse    3. Injury of nose, sequela    4. Hypertrophy of inferior nasal turbinate    5. Rhytidosis facialis          Plan:     Patient is doing well postoperatively. We discussed postop instructions including nasal saline q4 hours while awake, aquaphor twice a day to incisions and within nostrils. No nose blowing x 3 weeks. Discussed postop activity limitations. Follow up this Thursday, sooner if needed. Wear compression wrap as often as possible.

## 2022-07-05 NOTE — PROGRESS NOTES
Subjective:     Chief Complaint:   Chief Complaint   Patient presents with    Post-op Evaluation       Nenita Denis is a 62 y.o. female who present for postoperative visit.     Patient underwent septoplasty, nasal valve repair, SMRT, facelift on 6/27/22.   Since then, she has been doing well without complaints.     Past Medical History  She has a past medical history of GERD (gastroesophageal reflux disease), Hypertension, Plantar fasciitis, and PONV (postoperative nausea and vomiting).    Past Surgical History  She has a past surgical history that includes TONSILLECTOMY, ADENOIDECTOMY; Laparoscopic cholecystectomy; Carpal tunnel release; Release of ulnar nerve at cubital tunnel (Right); Nasal septoplasty (N/A, 6/27/2022); Nasal stenosis repair (N/A, 6/27/2022); and Brow lift (N/A, 6/27/2022).    Family History  Her family history includes Breast cancer (age of onset: 42) in her mother; Colon cancer in her father; No Known Problems in her brother and sister.    Social History  She reports that she has been smoking cigarettes. She has a 5.00 pack-year smoking history. She has never used smokeless tobacco. She reports current alcohol use. She reports that she does not use drugs.    Allergies  She has No Known Allergies.    Medications  She has a current medication list which includes the following prescription(s): b complex vitamins, bacitracin, clonazepam, fluticasone propionate, hydrocodone-acetaminophen, multivitamin, omega-3 fatty acids-vitamin e, ondansetron, probiotic formula (inulin), and sodium chloride.       Objective:     /72   Pulse 67   Temp 97 °F (36.1 °C) (Temporal)      Constitutional:   Vital signs are normal. She appears well-developed and well-nourished.     Head:          Nose:            Procedure    None           Assessment:     1. Nasal obstruction    2. Injury of nose, sequela    3. Nasal valve collapse    4. Hypertrophy of inferior nasal turbinate    5. Rhytidosis facialis           Plan:     Patient is doing well postoperatively. We discussed postop instructions including nasal saline q4 hours while awake, bacitracin twice a day to incisions and within nostrils. No nose blowing x 4 weeks. Discussed postop activity limitations. Follow up next Tuesday for suture removal, sooner if needed. Wear compression wrap as often as possible.

## 2022-07-07 ENCOUNTER — PATIENT MESSAGE (OUTPATIENT)
Dept: OTOLARYNGOLOGY | Facility: CLINIC | Age: 62
End: 2022-07-07

## 2022-07-07 ENCOUNTER — OFFICE VISIT (OUTPATIENT)
Dept: OTOLARYNGOLOGY | Facility: CLINIC | Age: 62
End: 2022-07-07
Payer: COMMERCIAL

## 2022-07-07 VITALS — SYSTOLIC BLOOD PRESSURE: 131 MMHG | HEART RATE: 78 BPM | TEMPERATURE: 98 F | DIASTOLIC BLOOD PRESSURE: 84 MMHG

## 2022-07-07 DIAGNOSIS — J34.89 NASAL OBSTRUCTION: Primary | ICD-10-CM

## 2022-07-07 DIAGNOSIS — L98.8 RHYTIDOSIS FACIALIS: ICD-10-CM

## 2022-07-07 DIAGNOSIS — S09.92XS INJURY OF NOSE, SEQUELA: ICD-10-CM

## 2022-07-07 DIAGNOSIS — J34.3 HYPERTROPHY OF INFERIOR NASAL TURBINATE: ICD-10-CM

## 2022-07-07 DIAGNOSIS — M95.0 NASAL VALVE COLLAPSE: ICD-10-CM

## 2022-07-07 PROCEDURE — 3079F DIAST BP 80-89 MM HG: CPT | Mod: CPTII,S$GLB,, | Performed by: OTOLARYNGOLOGY

## 2022-07-07 PROCEDURE — 1159F MED LIST DOCD IN RCRD: CPT | Mod: CPTII,S$GLB,, | Performed by: OTOLARYNGOLOGY

## 2022-07-07 PROCEDURE — 1160F RVW MEDS BY RX/DR IN RCRD: CPT | Mod: CPTII,S$GLB,, | Performed by: OTOLARYNGOLOGY

## 2022-07-07 PROCEDURE — 3075F SYST BP GE 130 - 139MM HG: CPT | Mod: CPTII,S$GLB,, | Performed by: OTOLARYNGOLOGY

## 2022-07-07 PROCEDURE — 1160F PR REVIEW ALL MEDS BY PRESCRIBER/CLIN PHARMACIST DOCUMENTED: ICD-10-PCS | Mod: CPTII,S$GLB,, | Performed by: OTOLARYNGOLOGY

## 2022-07-07 PROCEDURE — 1159F PR MEDICATION LIST DOCUMENTED IN MEDICAL RECORD: ICD-10-PCS | Mod: CPTII,S$GLB,, | Performed by: OTOLARYNGOLOGY

## 2022-07-07 PROCEDURE — 99024 POSTOP FOLLOW-UP VISIT: CPT | Mod: S$GLB,,, | Performed by: OTOLARYNGOLOGY

## 2022-07-07 PROCEDURE — 3079F PR MOST RECENT DIASTOLIC BLOOD PRESSURE 80-89 MM HG: ICD-10-PCS | Mod: CPTII,S$GLB,, | Performed by: OTOLARYNGOLOGY

## 2022-07-07 PROCEDURE — 3075F PR MOST RECENT SYSTOLIC BLOOD PRESS GE 130-139MM HG: ICD-10-PCS | Mod: CPTII,S$GLB,, | Performed by: OTOLARYNGOLOGY

## 2022-07-07 PROCEDURE — 99024 PR POST-OP FOLLOW-UP VISIT: ICD-10-PCS | Mod: S$GLB,,, | Performed by: OTOLARYNGOLOGY

## 2022-07-07 NOTE — PROGRESS NOTES
Subjective:     Chief Complaint:   Chief Complaint   Patient presents with    Post-op Evaluation     facelift       Nenita Denis is a 62 y.o. female who present for postoperative visit.     Patient underwent septoplasty, nasal valve repair, SMRT, facelift on 6/27/22.   Since then, she has been doing well without complaints.     Past Medical History  She has a past medical history of GERD (gastroesophageal reflux disease), Hypertension, Plantar fasciitis, and PONV (postoperative nausea and vomiting).    Past Surgical History  She has a past surgical history that includes TONSILLECTOMY, ADENOIDECTOMY; Laparoscopic cholecystectomy; Carpal tunnel release; Release of ulnar nerve at cubital tunnel (Right); Nasal septoplasty (N/A, 6/27/2022); Nasal stenosis repair (N/A, 6/27/2022); and Brow lift (N/A, 6/27/2022).    Family History  Her family history includes Breast cancer (age of onset: 42) in her mother; Colon cancer in her father; No Known Problems in her brother and sister.    Social History  She reports that she has been smoking cigarettes. She has a 5.00 pack-year smoking history. She has never used smokeless tobacco. She reports current alcohol use. She reports that she does not use drugs.    Allergies  She has No Known Allergies.    Medications  She has a current medication list which includes the following prescription(s): b complex vitamins, bacitracin, clonazepam, fluticasone propionate, hydrocodone-acetaminophen, multivitamin, omega-3 fatty acids-vitamin e, ondansetron, probiotic formula (inulin), and sodium chloride.       Objective:     /84   Pulse 78   Temp 97.9 °F (36.6 °C) (Temporal)      Constitutional:   Vital signs are normal. She appears well-developed and well-nourished.     Head:          Nose:        sutures removed, incisions healing well    Procedure    None           Assessment:     1. Nasal obstruction    2. Nasal valve collapse    3. Injury of nose, sequela    4. Hypertrophy  of inferior nasal turbinate    5. Rhytidosis facialis          Plan:     Patient is doing well postoperatively. We discussed postop instructions including nasal saline q4 hours while awake, aquaphor twice a day to incisions and within nostrils. No nose blowing x 2 weeks. Discussed postop activity limitations. Follow up this 2 weeks, sooner if needed. Wear compression wrap as often as possible.

## 2022-07-21 ENCOUNTER — PATIENT MESSAGE (OUTPATIENT)
Dept: OTOLARYNGOLOGY | Facility: CLINIC | Age: 62
End: 2022-07-21

## 2022-07-21 ENCOUNTER — OFFICE VISIT (OUTPATIENT)
Dept: OTOLARYNGOLOGY | Facility: CLINIC | Age: 62
End: 2022-07-21
Payer: COMMERCIAL

## 2022-07-21 VITALS — DIASTOLIC BLOOD PRESSURE: 84 MMHG | TEMPERATURE: 98 F | HEART RATE: 77 BPM | SYSTOLIC BLOOD PRESSURE: 126 MMHG

## 2022-07-21 DIAGNOSIS — J01.90 ACUTE NON-RECURRENT SINUSITIS, UNSPECIFIED LOCATION: Primary | ICD-10-CM

## 2022-07-21 PROCEDURE — 99024 POSTOP FOLLOW-UP VISIT: CPT | Mod: S$GLB,,, | Performed by: OTOLARYNGOLOGY

## 2022-07-21 PROCEDURE — 99024 PR POST-OP FOLLOW-UP VISIT: ICD-10-PCS | Mod: S$GLB,,, | Performed by: OTOLARYNGOLOGY

## 2022-07-21 PROCEDURE — 1160F PR REVIEW ALL MEDS BY PRESCRIBER/CLIN PHARMACIST DOCUMENTED: ICD-10-PCS | Mod: CPTII,S$GLB,, | Performed by: OTOLARYNGOLOGY

## 2022-07-21 PROCEDURE — 3079F DIAST BP 80-89 MM HG: CPT | Mod: CPTII,S$GLB,, | Performed by: OTOLARYNGOLOGY

## 2022-07-21 PROCEDURE — 3079F PR MOST RECENT DIASTOLIC BLOOD PRESSURE 80-89 MM HG: ICD-10-PCS | Mod: CPTII,S$GLB,, | Performed by: OTOLARYNGOLOGY

## 2022-07-21 PROCEDURE — 1160F RVW MEDS BY RX/DR IN RCRD: CPT | Mod: CPTII,S$GLB,, | Performed by: OTOLARYNGOLOGY

## 2022-07-21 PROCEDURE — 1159F PR MEDICATION LIST DOCUMENTED IN MEDICAL RECORD: ICD-10-PCS | Mod: CPTII,S$GLB,, | Performed by: OTOLARYNGOLOGY

## 2022-07-21 PROCEDURE — 3074F SYST BP LT 130 MM HG: CPT | Mod: CPTII,S$GLB,, | Performed by: OTOLARYNGOLOGY

## 2022-07-21 PROCEDURE — 1159F MED LIST DOCD IN RCRD: CPT | Mod: CPTII,S$GLB,, | Performed by: OTOLARYNGOLOGY

## 2022-07-21 PROCEDURE — 3074F PR MOST RECENT SYSTOLIC BLOOD PRESSURE < 130 MM HG: ICD-10-PCS | Mod: CPTII,S$GLB,, | Performed by: OTOLARYNGOLOGY

## 2022-07-21 RX ORDER — AZITHROMYCIN 250 MG/1
TABLET, FILM COATED ORAL
Qty: 6 TABLET | Refills: 0 | Status: SHIPPED | OUTPATIENT
Start: 2022-07-21 | End: 2022-07-26

## 2022-07-21 NOTE — PROGRESS NOTES
Subjective:     Chief Complaint:   Chief Complaint   Patient presents with    Post-op Evaluation       Nenita Denis is a 62 y.o. female who present for postoperative visit.     Patient underwent septoplasty, nasal valve repair, SMRT, facelift on 6/27/22.   Since then, she has been doing well without complaints.     Past Medical History  She has a past medical history of GERD (gastroesophageal reflux disease), Hypertension, Plantar fasciitis, and PONV (postoperative nausea and vomiting).    Past Surgical History  She has a past surgical history that includes TONSILLECTOMY, ADENOIDECTOMY; Laparoscopic cholecystectomy; Carpal tunnel release; Release of ulnar nerve at cubital tunnel (Right); Nasal septoplasty (N/A, 6/27/2022); Nasal stenosis repair (N/A, 6/27/2022); and Brow lift (N/A, 6/27/2022).    Family History  Her family history includes Breast cancer (age of onset: 42) in her mother; Colon cancer in her father; No Known Problems in her brother and sister.    Social History  She reports that she has been smoking cigarettes. She has a 5.00 pack-year smoking history. She has never used smokeless tobacco. She reports current alcohol use. She reports that she does not use drugs.    Allergies  She has No Known Allergies.    Medications  She has a current medication list which includes the following prescription(s): b complex vitamins, bacitracin, clonazepam, fluticasone propionate, multivitamin, omega-3 fatty acids-vitamin e, probiotic formula (inulin), sodium chloride, azithromycin, hydrocodone-acetaminophen, and ondansetron.       Objective:     /84   Pulse 77   Temp 97.7 °F (36.5 °C) (Temporal)      Constitutional:   Vital signs are normal. She appears well-developed and well-nourished.     Head:          Nose:        sutures removed, incisions healing well    Procedure    None           Assessment:     1. Acute non-recurrent sinusitis, unspecified location          Plan:     Patient is doing well  postoperatively.  Will treat acute sinusitis with Zithromax and NeilMed sinus rinse.  Discussed use with distilled water only.  Sample given.  Discussed avoiding any smoke exposure.  Follow-up in 1 month, sooner if needed.  She is considering Botox and filler injections at the next visit.  All questions answered patient was encouraged call with any questions or concerns.

## 2022-08-18 ENCOUNTER — OFFICE VISIT (OUTPATIENT)
Dept: OTOLARYNGOLOGY | Facility: CLINIC | Age: 62
End: 2022-08-18
Payer: COMMERCIAL

## 2022-08-18 VITALS — DIASTOLIC BLOOD PRESSURE: 80 MMHG | SYSTOLIC BLOOD PRESSURE: 120 MMHG | TEMPERATURE: 97 F | HEART RATE: 77 BPM

## 2022-08-18 DIAGNOSIS — J34.3 HYPERTROPHY OF INFERIOR NASAL TURBINATE: ICD-10-CM

## 2022-08-18 DIAGNOSIS — M95.0 NASAL VALVE COLLAPSE: ICD-10-CM

## 2022-08-18 DIAGNOSIS — L98.8 RHYTIDOSIS FACIALIS: ICD-10-CM

## 2022-08-18 DIAGNOSIS — S09.92XS INJURY OF NOSE, SEQUELA: ICD-10-CM

## 2022-08-18 DIAGNOSIS — J34.89 NASAL OBSTRUCTION: Primary | ICD-10-CM

## 2022-08-18 PROCEDURE — 3079F PR MOST RECENT DIASTOLIC BLOOD PRESSURE 80-89 MM HG: ICD-10-PCS | Mod: CPTII,S$GLB,, | Performed by: OTOLARYNGOLOGY

## 2022-08-18 PROCEDURE — 1160F PR REVIEW ALL MEDS BY PRESCRIBER/CLIN PHARMACIST DOCUMENTED: ICD-10-PCS | Mod: CPTII,S$GLB,, | Performed by: OTOLARYNGOLOGY

## 2022-08-18 PROCEDURE — 99024 POSTOP FOLLOW-UP VISIT: CPT | Mod: S$GLB,,, | Performed by: OTOLARYNGOLOGY

## 2022-08-18 PROCEDURE — 99024 PR POST-OP FOLLOW-UP VISIT: ICD-10-PCS | Mod: S$GLB,,, | Performed by: OTOLARYNGOLOGY

## 2022-08-18 PROCEDURE — 3079F DIAST BP 80-89 MM HG: CPT | Mod: CPTII,S$GLB,, | Performed by: OTOLARYNGOLOGY

## 2022-08-18 PROCEDURE — 3074F SYST BP LT 130 MM HG: CPT | Mod: CPTII,S$GLB,, | Performed by: OTOLARYNGOLOGY

## 2022-08-18 PROCEDURE — 1159F MED LIST DOCD IN RCRD: CPT | Mod: CPTII,S$GLB,, | Performed by: OTOLARYNGOLOGY

## 2022-08-18 PROCEDURE — 3074F PR MOST RECENT SYSTOLIC BLOOD PRESSURE < 130 MM HG: ICD-10-PCS | Mod: CPTII,S$GLB,, | Performed by: OTOLARYNGOLOGY

## 2022-08-18 PROCEDURE — 1160F RVW MEDS BY RX/DR IN RCRD: CPT | Mod: CPTII,S$GLB,, | Performed by: OTOLARYNGOLOGY

## 2022-08-18 PROCEDURE — 1159F PR MEDICATION LIST DOCUMENTED IN MEDICAL RECORD: ICD-10-PCS | Mod: CPTII,S$GLB,, | Performed by: OTOLARYNGOLOGY

## 2022-08-20 NOTE — PROGRESS NOTES
Subjective:     Chief Complaint:   Chief Complaint   Patient presents with    Follow-up       Nenita Denis is a 62 y.o. female who present for postoperative visit.     Patient underwent septoplasty, nasal valve repair, SMRT, facelift on 6/27/22.   Since then, she has been doing well without complaints.     Past Medical History  She has a past medical history of GERD (gastroesophageal reflux disease), Hypertension, Plantar fasciitis, and PONV (postoperative nausea and vomiting).    Past Surgical History  She has a past surgical history that includes TONSILLECTOMY, ADENOIDECTOMY; Laparoscopic cholecystectomy; Carpal tunnel release; Release of ulnar nerve at cubital tunnel (Right); Nasal septoplasty (N/A, 6/27/2022); Nasal stenosis repair (N/A, 6/27/2022); and Brow lift (N/A, 6/27/2022).    Family History  Her family history includes Breast cancer (age of onset: 42) in her mother; Colon cancer in her father; No Known Problems in her brother and sister.    Social History  She reports that she has been smoking cigarettes. She has a 5.00 pack-year smoking history. She has never used smokeless tobacco. She reports current alcohol use. She reports that she does not use drugs.    Allergies  She has No Known Allergies.    Medications  She has a current medication list which includes the following prescription(s): b complex vitamins, bacitracin, clonazepam, fluticasone propionate, multivitamin, omega-3 fatty acids-vitamin e, probiotic formula (inulin), and sodium chloride.       Objective:     /80   Pulse 77   Temp 97 °F (36.1 °C) (Temporal)      Constitutional:   Vital signs are normal. She appears well-developed and well-nourished.     Head:          Nose:        sutures removed, incisions healing well    Procedure  Procedure: Botox and filler injection  Surgeon: Keegan Davis MD  Risks, benefits and alternatives of botox and HA filler injection discussed. Patient wished to proceed. Written consent was  obtained.  Topical numbing cream was applied.  Adequate time was allowed for the anesthetic to take effect.  The patient was then cleaned.  The treatment areas were cleaned with alcohol pad.   45 units of Botox  20 galbella  10/10 orbicularis oculi  5 mentalis    1 cc restalyn sylk injected into lips and marionette lines    Patient tolerated the procedure well without complications.       Assessment:     1. Nasal obstruction    2. Nasal valve collapse    3. Injury of nose, sequela    4. Hypertrophy of inferior nasal turbinate    5. Rhytidosis facialis       septoplasty, nasal valve repair, SMRT, facelift on 6/27/22  Plan:     Patient is doing well postoperatively.  Follow up in 3 months, sooner if needed. All questions answered patient was encouraged call with any questions or concerns.                  Answers for HPI/ROS submitted by the patient on 8/12/2022  None of these: Yes  facial swelling: Yes  None of these : Yes  None of these: Yes  None of these : Yes  None of these: Yes  None of these: Yes  None of these: Yes  None of these : Yes  Seasonal Allergies?: Yes  None of these : Yes  None of these: Yes  None of these: Yes  nervous/ anxious: Yes

## 2022-10-17 ENCOUNTER — PATIENT MESSAGE (OUTPATIENT)
Dept: OTOLARYNGOLOGY | Facility: CLINIC | Age: 62
End: 2022-10-17
Payer: COMMERCIAL

## 2022-11-15 ENCOUNTER — TELEPHONE (OUTPATIENT)
Dept: OTOLARYNGOLOGY | Facility: CLINIC | Age: 62
End: 2022-11-15
Payer: COMMERCIAL

## 2022-11-15 ENCOUNTER — PATIENT MESSAGE (OUTPATIENT)
Dept: OTOLARYNGOLOGY | Facility: CLINIC | Age: 62
End: 2022-11-15
Payer: COMMERCIAL

## 2022-11-22 ENCOUNTER — OFFICE VISIT (OUTPATIENT)
Dept: OTOLARYNGOLOGY | Facility: CLINIC | Age: 62
End: 2022-11-22

## 2022-11-22 VITALS
BODY MASS INDEX: 31.01 KG/M2 | SYSTOLIC BLOOD PRESSURE: 110 MMHG | DIASTOLIC BLOOD PRESSURE: 84 MMHG | HEIGHT: 63 IN | WEIGHT: 175 LBS

## 2022-11-22 DIAGNOSIS — L98.8 RHYTIDOSIS FACIALIS: ICD-10-CM

## 2022-11-22 DIAGNOSIS — M95.0 NASAL VALVE COLLAPSE: ICD-10-CM

## 2022-11-22 DIAGNOSIS — J34.89 NASAL OBSTRUCTION: Primary | ICD-10-CM

## 2022-11-22 DIAGNOSIS — J34.3 HYPERTROPHY OF INFERIOR NASAL TURBINATE: ICD-10-CM

## 2022-11-22 DIAGNOSIS — S09.92XS INJURY OF NOSE, SEQUELA: ICD-10-CM

## 2022-11-22 PROCEDURE — 99024 POSTOP FOLLOW-UP VISIT: CPT | Mod: S$GLB,,, | Performed by: OTOLARYNGOLOGY

## 2022-11-22 PROCEDURE — 99024 PR POST-OP FOLLOW-UP VISIT: ICD-10-PCS | Mod: S$GLB,,, | Performed by: OTOLARYNGOLOGY

## 2022-11-22 NOTE — PROGRESS NOTES
"  Subjective:     Chief Complaint:   Chief Complaint   Patient presents with    Follow-up         Nenita Denis is a 62 y.o. female who present for postoperative visit.     Patient underwent septoplasty, nasal valve repair, SMRT, facelift on 6/27/22.   Since then, she has been doing well without complaints.     Past Medical History  She has a past medical history of GERD (gastroesophageal reflux disease), Hypertension, Plantar fasciitis, and PONV (postoperative nausea and vomiting).    Past Surgical History  She has a past surgical history that includes TONSILLECTOMY, ADENOIDECTOMY; Laparoscopic cholecystectomy; Carpal tunnel release; Release of ulnar nerve at cubital tunnel (Right); Nasal septoplasty (N/A, 6/27/2022); Nasal stenosis repair (N/A, 6/27/2022); and Brow lift (N/A, 6/27/2022).    Family History  Her family history includes Breast cancer (age of onset: 42) in her mother; Colon cancer in her father; No Known Problems in her brother and sister.    Social History  She reports that she has been smoking cigarettes. She has a 5.00 pack-year smoking history. She has never used smokeless tobacco. She reports current alcohol use. She reports that she does not use drugs.    Allergies  She has No Known Allergies.    Medications  She has a current medication list which includes the following prescription(s): b complex vitamins, clonazepam, fluticasone propionate, multivitamin, omega-3 fatty acids-vitamin e, probiotic formula (inulin), sodium chloride, and bacitracin.       Objective:     /84 (BP Location: Left arm, Patient Position: Sitting, BP Method: Medium (Manual))   Ht 5' 3" (1.6 m)   Wt 79.4 kg (175 lb)   BMI 31.00 kg/m²      Constitutional:   Vital signs are normal. She appears well-developed and well-nourished.     Head:          Nose:          Procedure  Procedure: Botox and filler injection  Surgeon: Keegan Davis MD  Risks, benefits and alternatives of botox and HA filler injection discussed. " Patient wished to proceed. Written consent was obtained.  Topical numbing cream was applied.  Adequate time was allowed for the anesthetic to take effect.  The patient was then cleaned.  The treatment areas were cleaned with alcohol pad.   45 units of Botox  20 galbella  10/10 orbicularis oculi  5 mentalis    1 cc restalyn refyne injected into lips and marionette lines    Patient tolerated the procedure well without complications.       Assessment:     1. Nasal obstruction    2. Nasal valve collapse    3. Injury of nose, sequela    4. Hypertrophy of inferior nasal turbinate    5. Rhytidosis facialis         septoplasty, nasal valve repair, SMRT, facelift on 6/27/22  Plan:     Patient is doing well postoperatively.  Follow up in 3 months, sooner if needed. All questions answered patient was encouraged call with any questions or concerns.            Answers submitted by the patient for this visit:  Review of Symptoms Questionnaire  (Submitted on 11/15/2022)  None of these: Yes  facial swelling: Yes  None of these : Yes  None of these: Yes  None of these : Yes  None of these: Yes  None of these: Yes  None of these: Yes  None of these : Yes  Seasonal Allergies?: Yes  None of these : Yes  None of these: Yes  None of these: Yes  nervous/ anxious: Yes

## 2023-02-23 ENCOUNTER — OFFICE VISIT (OUTPATIENT)
Dept: OTOLARYNGOLOGY | Facility: CLINIC | Age: 63
End: 2023-02-23
Payer: COMMERCIAL

## 2023-02-23 VITALS
HEART RATE: 102 BPM | WEIGHT: 150.13 LBS | DIASTOLIC BLOOD PRESSURE: 72 MMHG | BODY MASS INDEX: 26.59 KG/M2 | SYSTOLIC BLOOD PRESSURE: 122 MMHG

## 2023-02-23 DIAGNOSIS — S09.92XS INJURY OF NOSE, SEQUELA: ICD-10-CM

## 2023-02-23 DIAGNOSIS — J34.3 HYPERTROPHY OF INFERIOR NASAL TURBINATE: ICD-10-CM

## 2023-02-23 DIAGNOSIS — M95.0 NASAL VALVE COLLAPSE: ICD-10-CM

## 2023-02-23 DIAGNOSIS — J34.89 NASAL OBSTRUCTION: Primary | ICD-10-CM

## 2023-02-23 PROCEDURE — 3074F SYST BP LT 130 MM HG: CPT | Mod: CPTII,S$GLB,, | Performed by: OTOLARYNGOLOGY

## 2023-02-23 PROCEDURE — 99212 OFFICE O/P EST SF 10 MIN: CPT | Mod: S$GLB,,, | Performed by: OTOLARYNGOLOGY

## 2023-02-23 PROCEDURE — 3008F PR BODY MASS INDEX (BMI) DOCUMENTED: ICD-10-PCS | Mod: CPTII,S$GLB,, | Performed by: OTOLARYNGOLOGY

## 2023-02-23 PROCEDURE — 99999 PR PBB SHADOW E&M-EST. PATIENT-LVL III: CPT | Mod: PBBFAC,,, | Performed by: OTOLARYNGOLOGY

## 2023-02-23 PROCEDURE — 1159F MED LIST DOCD IN RCRD: CPT | Mod: CPTII,S$GLB,, | Performed by: OTOLARYNGOLOGY

## 2023-02-23 PROCEDURE — 1159F PR MEDICATION LIST DOCUMENTED IN MEDICAL RECORD: ICD-10-PCS | Mod: CPTII,S$GLB,, | Performed by: OTOLARYNGOLOGY

## 2023-02-23 PROCEDURE — 3074F PR MOST RECENT SYSTOLIC BLOOD PRESSURE < 130 MM HG: ICD-10-PCS | Mod: CPTII,S$GLB,, | Performed by: OTOLARYNGOLOGY

## 2023-02-23 PROCEDURE — 1160F PR REVIEW ALL MEDS BY PRESCRIBER/CLIN PHARMACIST DOCUMENTED: ICD-10-PCS | Mod: CPTII,S$GLB,, | Performed by: OTOLARYNGOLOGY

## 2023-02-23 PROCEDURE — 99212 PR OFFICE/OUTPT VISIT, EST, LEVL II, 10-19 MIN: ICD-10-PCS | Mod: S$GLB,,, | Performed by: OTOLARYNGOLOGY

## 2023-02-23 PROCEDURE — 99999 PR PBB SHADOW E&M-EST. PATIENT-LVL III: ICD-10-PCS | Mod: PBBFAC,,, | Performed by: OTOLARYNGOLOGY

## 2023-02-23 PROCEDURE — 1160F RVW MEDS BY RX/DR IN RCRD: CPT | Mod: CPTII,S$GLB,, | Performed by: OTOLARYNGOLOGY

## 2023-02-23 PROCEDURE — 3078F PR MOST RECENT DIASTOLIC BLOOD PRESSURE < 80 MM HG: ICD-10-PCS | Mod: CPTII,S$GLB,, | Performed by: OTOLARYNGOLOGY

## 2023-02-23 PROCEDURE — 3078F DIAST BP <80 MM HG: CPT | Mod: CPTII,S$GLB,, | Performed by: OTOLARYNGOLOGY

## 2023-02-23 PROCEDURE — 3008F BODY MASS INDEX DOCD: CPT | Mod: CPTII,S$GLB,, | Performed by: OTOLARYNGOLOGY

## 2023-02-27 NOTE — PROGRESS NOTES
Subjective:     Chief Complaint:   Chief Complaint   Patient presents with    Follow-up         Nenita Denis is a 63 y.o. female who present for postoperative visit.     Patient underwent septoplasty, nasal valve repair, SMRT, facelift on 6/27/22.   Since then, she has been doing well without complaints.  Was intentionally lost about 20 lb.  Her goal is for an additional 10-15 lb.     Past Medical History  She has a past medical history of GERD (gastroesophageal reflux disease), Hypertension, Plantar fasciitis, and PONV (postoperative nausea and vomiting).    Past Surgical History  She has a past surgical history that includes TONSILLECTOMY, ADENOIDECTOMY; Laparoscopic cholecystectomy; Carpal tunnel release; Release of ulnar nerve at cubital tunnel (Right); Nasal septoplasty (N/A, 6/27/2022); Nasal stenosis repair (N/A, 6/27/2022); and Brow lift (N/A, 6/27/2022).    Family History  Her family history includes Breast cancer (age of onset: 42) in her mother; Colon cancer in her father; No Known Problems in her brother and sister.    Social History  She reports that she has been smoking cigarettes. She has a 5.00 pack-year smoking history. She has never used smokeless tobacco. She reports current alcohol use. She reports that she does not use drugs.    Allergies  She has No Known Allergies.    Medications  She has a current medication list which includes the following prescription(s): b complex vitamins, clonazepam, fluticasone propionate, multivitamin, omega-3 fatty acids-vitamin e, probiotic formula (inulin), and sodium chloride.       Objective:     /72   Pulse 102   Wt 68.1 kg (150 lb 2.1 oz) Comment: 149lb  BMI 26.59 kg/m²      Constitutional:   Vital signs are normal. She appears well-developed and well-nourished.     Head:          Nose:              Assessment:     1. Nasal obstruction    2. Nasal valve collapse    3. Injury of nose, sequela    4. Hypertrophy of inferior nasal turbinate            septoplasty, nasal valve repair, SMRT, facelift on 6/27/22  Plan:     Patient is doing well postoperatively.  Follow up in 3 months, sooner if needed. Discussed possible submental skin excision if interested. All questions answered patient was encouraged call with any questions or concerns.        Answers submitted by the patient for this visit:  Review of Symptoms Questionnaire  (Submitted on 2/20/2023)  None of these: Yes  facial swelling: Yes  None of these : Yes  None of these: Yes  None of these : Yes  None of these: Yes  None of these: Yes  None of these: Yes  None of these : Yes  None of these: Yes  None of these : Yes  None of these: Yes  None of these: Yes  None of these: Yes

## 2023-05-25 ENCOUNTER — OFFICE VISIT (OUTPATIENT)
Dept: OTOLARYNGOLOGY | Facility: CLINIC | Age: 63
End: 2023-05-25
Payer: COMMERCIAL

## 2023-05-25 VITALS
WEIGHT: 139.31 LBS | HEART RATE: 84 BPM | SYSTOLIC BLOOD PRESSURE: 125 MMHG | DIASTOLIC BLOOD PRESSURE: 76 MMHG | BODY MASS INDEX: 24.68 KG/M2

## 2023-05-25 DIAGNOSIS — L98.8 RHYTIDOSIS FACIALIS: Primary | ICD-10-CM

## 2023-05-25 PROCEDURE — 99024 POSTOP FOLLOW-UP VISIT: CPT | Mod: S$GLB,,, | Performed by: OTOLARYNGOLOGY

## 2023-05-25 PROCEDURE — 3078F PR MOST RECENT DIASTOLIC BLOOD PRESSURE < 80 MM HG: ICD-10-PCS | Mod: CPTII,S$GLB,, | Performed by: OTOLARYNGOLOGY

## 2023-05-25 PROCEDURE — 3008F PR BODY MASS INDEX (BMI) DOCUMENTED: ICD-10-PCS | Mod: CPTII,S$GLB,, | Performed by: OTOLARYNGOLOGY

## 2023-05-25 PROCEDURE — 99999 PR PBB SHADOW E&M-EST. PATIENT-LVL III: CPT | Mod: PBBFAC,,, | Performed by: OTOLARYNGOLOGY

## 2023-05-25 PROCEDURE — 1160F PR REVIEW ALL MEDS BY PRESCRIBER/CLIN PHARMACIST DOCUMENTED: ICD-10-PCS | Mod: CPTII,S$GLB,, | Performed by: OTOLARYNGOLOGY

## 2023-05-25 PROCEDURE — 3078F DIAST BP <80 MM HG: CPT | Mod: CPTII,S$GLB,, | Performed by: OTOLARYNGOLOGY

## 2023-05-25 PROCEDURE — 99999 PR PBB SHADOW E&M-EST. PATIENT-LVL III: ICD-10-PCS | Mod: PBBFAC,,, | Performed by: OTOLARYNGOLOGY

## 2023-05-25 PROCEDURE — 1159F MED LIST DOCD IN RCRD: CPT | Mod: CPTII,S$GLB,, | Performed by: OTOLARYNGOLOGY

## 2023-05-25 PROCEDURE — 3074F SYST BP LT 130 MM HG: CPT | Mod: CPTII,S$GLB,, | Performed by: OTOLARYNGOLOGY

## 2023-05-25 PROCEDURE — 99024 PR POST-OP FOLLOW-UP VISIT: ICD-10-PCS | Mod: S$GLB,,, | Performed by: OTOLARYNGOLOGY

## 2023-05-25 PROCEDURE — 3008F BODY MASS INDEX DOCD: CPT | Mod: CPTII,S$GLB,, | Performed by: OTOLARYNGOLOGY

## 2023-05-25 PROCEDURE — 1159F PR MEDICATION LIST DOCUMENTED IN MEDICAL RECORD: ICD-10-PCS | Mod: CPTII,S$GLB,, | Performed by: OTOLARYNGOLOGY

## 2023-05-25 PROCEDURE — 1160F RVW MEDS BY RX/DR IN RCRD: CPT | Mod: CPTII,S$GLB,, | Performed by: OTOLARYNGOLOGY

## 2023-05-25 PROCEDURE — 3074F PR MOST RECENT SYSTOLIC BLOOD PRESSURE < 130 MM HG: ICD-10-PCS | Mod: CPTII,S$GLB,, | Performed by: OTOLARYNGOLOGY

## 2023-05-27 NOTE — PROGRESS NOTES
Subjective:     Chief Complaint:   Chief Complaint   Patient presents with    Follow-up         Nenita Denis is a 63 y.o. female who present for postoperative visit.     Patient underwent septoplasty, nasal valve repair, SMRT, facelift on 6/27/22.   Since then, she has been doing well without complaints.  Has intentionally lost about 35 lb. At her goal weight. Continues to smoke but working on cutting back.     Past Medical History  She has a past medical history of GERD (gastroesophageal reflux disease), Hypertension, Plantar fasciitis, and PONV (postoperative nausea and vomiting).    Past Surgical History  She has a past surgical history that includes TONSILLECTOMY, ADENOIDECTOMY; Laparoscopic cholecystectomy; Carpal tunnel release; Release of ulnar nerve at cubital tunnel (Right); Nasal septoplasty (N/A, 6/27/2022); Nasal stenosis repair (N/A, 6/27/2022); and Brow lift (N/A, 6/27/2022).    Family History  Her family history includes Breast cancer (age of onset: 42) in her mother; Colon cancer in her father; No Known Problems in her brother and sister.    Social History  She reports that she has been smoking cigarettes. She has a 5.00 pack-year smoking history. She has never used smokeless tobacco. She reports current alcohol use. She reports that she does not use drugs.    Allergies  She has No Known Allergies.    Medications  She has a current medication list which includes the following prescription(s): b complex vitamins, clonazepam, fluticasone propionate, multivitamin, omega-3 fatty acids-vitamin e, probiotic formula (inulin), and sodium chloride.       Objective:     /76   Pulse 84   Wt 63.2 kg (139 lb 5.3 oz)   BMI 24.68 kg/m²      Constitutional:   Vital signs are normal. She appears well-developed and well-nourished.     Head:          Nose:              Assessment:     1. Rhytidosis facialis         septoplasty, nasal valve repair, SMRT, facelift on 6/27/22  Plan:     Patient is doing  well postoperatively.  She has lost apporx 35 lbs and is at goal weight. She has some increased submental laxity now. Discussed option for neck lift, possible submental skin excision if interested. She is interested in in-office procedure. Discussed quitting smoking prior at least 4 weeks before and after. Will provide quote for in-office procedure. All questions answered patient was encouraged call with any questions or concerns.      Answers submitted by the patient for this visit:  Review of Symptoms Questionnaire  (Submitted on 5/22/2023)  None of these: Yes  None of these: Yes  None of these : Yes  None of these: Yes  None of these : Yes  Acid Reflux?: Yes  None of these: Yes  None of these: Yes  None of these : Yes  Seasonal Allergies?: Yes  None of these : Yes  None of these: Yes  None of these: Yes  None of these: Yes

## 2023-06-07 ENCOUNTER — PATIENT MESSAGE (OUTPATIENT)
Dept: OTOLARYNGOLOGY | Facility: CLINIC | Age: 63
End: 2023-06-07
Payer: COMMERCIAL

## 2023-06-29 ENCOUNTER — OFFICE VISIT (OUTPATIENT)
Dept: OTOLARYNGOLOGY | Facility: CLINIC | Age: 63
End: 2023-06-29
Payer: COMMERCIAL

## 2023-06-29 DIAGNOSIS — L98.8 RHYTIDOSIS FACIALIS: Primary | ICD-10-CM

## 2023-06-29 PROCEDURE — 1159F PR MEDICATION LIST DOCUMENTED IN MEDICAL RECORD: ICD-10-PCS | Mod: CPTII,S$GLB,, | Performed by: OTOLARYNGOLOGY

## 2023-06-29 PROCEDURE — 1160F RVW MEDS BY RX/DR IN RCRD: CPT | Mod: CPTII,S$GLB,, | Performed by: OTOLARYNGOLOGY

## 2023-06-29 PROCEDURE — 99024 PR POST-OP FOLLOW-UP VISIT: ICD-10-PCS | Mod: S$GLB,,, | Performed by: OTOLARYNGOLOGY

## 2023-06-29 PROCEDURE — 99024 POSTOP FOLLOW-UP VISIT: CPT | Mod: S$GLB,,, | Performed by: OTOLARYNGOLOGY

## 2023-06-29 PROCEDURE — 1160F PR REVIEW ALL MEDS BY PRESCRIBER/CLIN PHARMACIST DOCUMENTED: ICD-10-PCS | Mod: CPTII,S$GLB,, | Performed by: OTOLARYNGOLOGY

## 2023-06-29 PROCEDURE — 99999 PR PBB SHADOW E&M-EST. PATIENT-LVL II: CPT | Mod: PBBFAC,,, | Performed by: OTOLARYNGOLOGY

## 2023-06-29 PROCEDURE — 1159F MED LIST DOCD IN RCRD: CPT | Mod: CPTII,S$GLB,, | Performed by: OTOLARYNGOLOGY

## 2023-06-29 PROCEDURE — 99999 PR PBB SHADOW E&M-EST. PATIENT-LVL II: ICD-10-PCS | Mod: PBBFAC,,, | Performed by: OTOLARYNGOLOGY

## 2023-06-29 NOTE — PROGRESS NOTES
Service: Facial Plastic and Reconstructive Surgery     Patient: Nenita Denis     MRN: [id number]     Date: 06/29/2023     Preoperative Diagnosis:   Neck skin laxity      Postoperative Diagnosis:   Neck skin laxity      Surgeon: Keegan Davis MD     Procedures:   1. neck lift, submental liposuction     Anesthesia: Local      Complications: none     Blood loss: 5 ml     Indications: Nenita Denis is a pleasant 63 y.o. female who presented with age-related facial changes that she wanted to see addressed. She had 44 lbs of weight loss following her previous procedure and was left with continued neck laxity. she wished to proceed with the aforementioned procedure; written consent was obtained.     Operation:  The patient was properly identified in the procedure room area where informed written consent was obtained. The patient was brought back to the procedure suite and placed supine on the operating room table.  A timeout was performed.     The facial and neck soft tissue planes were injected with a total of 100 cc of 0.25% lidocaine with [id number] of epinephrine.  Adequate time was allowed for the anesthetic to take effect. We 1st turned our attention to the submental region.  The 15 blade was used to make an incision just below the submental crease.  This was carried through skin and subcutaneous tissue.  The 4 mm liposuction cannula was then used to perform the submental liposuction. Further subcutaneous tunneling was then performed laterally to allow for re draping of the neck skin.  The incision was then closed with 5-0 fast-gut suture. We then turned attention to the right side of the face.  An incision was then made on the right side extending pre auricularly, post tragal then infra lobular extending postauricularly into the hairline.  The skin was elevated in a subcutaneous plane.  The skin flap was elevated and connected to the central submental flap elevation.  Once the subcutaneous flaps were elevated,  the inferior sub-SMAS plane was then entered.  Careful dissection with face of scissors was performed in a sub-SMAS as plane until mobilization of the jowl was achieved.  Was then performed below the platysma a uniform SMAS/platysmal flap was elevated.  Dissection was carefully carried inferiorly until laxity be reduced with elevation The platysma was then elevated and secured with 2-0 silk suture.  3-0 PDS suture was placed to secure the SMAS flap in place.  This significantly to improve the jawline and neck region. Hemostasis was achieved with the bipolar electrocautery.  The skin flaps were then draped into place and trimmed carefully with the face of scissors.  The skin flaps were then elevated again artist tissue sealant was sprayed into the dissection bed.  The flaps were then laid back down and held into place for the tissue sealant to adhere.  The scalp incisions were closed with staples.  The infra-lobular incision was closed with a 5-0 deep dermal Monocryl suture.  The pre-auricular incisions closed with 6-0 Prolene suture.  The post tragal incision was closed with 5-0 fast gut suture.  The postauricular incision was closed with 5-0 chromic suture.  We then turned our attention to the left side where a similar procedure was performed for symmetry.  There were no deviations or complications.  Once complete the patient was cleaned.  Bacitracin was placed over the incisions.  Telfa and fluffs were placed and a soft compression wrap was then applied.  The patient tolerated the procedure well without complications.

## 2023-06-30 ENCOUNTER — OFFICE VISIT (OUTPATIENT)
Dept: OTOLARYNGOLOGY | Facility: CLINIC | Age: 63
End: 2023-06-30
Payer: COMMERCIAL

## 2023-06-30 DIAGNOSIS — L98.8 RHYTIDOSIS FACIALIS: Primary | ICD-10-CM

## 2023-06-30 PROCEDURE — 1160F PR REVIEW ALL MEDS BY PRESCRIBER/CLIN PHARMACIST DOCUMENTED: ICD-10-PCS | Mod: CPTII,S$GLB,, | Performed by: OTOLARYNGOLOGY

## 2023-06-30 PROCEDURE — 1159F MED LIST DOCD IN RCRD: CPT | Mod: CPTII,S$GLB,, | Performed by: OTOLARYNGOLOGY

## 2023-06-30 PROCEDURE — 1160F RVW MEDS BY RX/DR IN RCRD: CPT | Mod: CPTII,S$GLB,, | Performed by: OTOLARYNGOLOGY

## 2023-06-30 PROCEDURE — 99999 PR PBB SHADOW E&M-EST. PATIENT-LVL II: ICD-10-PCS | Mod: PBBFAC,,, | Performed by: OTOLARYNGOLOGY

## 2023-06-30 PROCEDURE — 99024 POSTOP FOLLOW-UP VISIT: CPT | Mod: S$GLB,,, | Performed by: OTOLARYNGOLOGY

## 2023-06-30 PROCEDURE — 99024 PR POST-OP FOLLOW-UP VISIT: ICD-10-PCS | Mod: S$GLB,,, | Performed by: OTOLARYNGOLOGY

## 2023-06-30 PROCEDURE — 1159F PR MEDICATION LIST DOCUMENTED IN MEDICAL RECORD: ICD-10-PCS | Mod: CPTII,S$GLB,, | Performed by: OTOLARYNGOLOGY

## 2023-06-30 PROCEDURE — 99999 PR PBB SHADOW E&M-EST. PATIENT-LVL II: CPT | Mod: PBBFAC,,, | Performed by: OTOLARYNGOLOGY

## 2023-06-30 RX ORDER — CEPHALEXIN 500 MG/1
500 CAPSULE ORAL EVERY 8 HOURS
Qty: 21 CAPSULE | Refills: 0 | Status: SHIPPED | OUTPATIENT
Start: 2023-06-30 | End: 2023-07-07

## 2023-06-30 NOTE — PROGRESS NOTES
Subjective:     Chief Complaint: postop      Nenita Denis is a 63 y.o. female who present for postoperative visit.     Patient underwent necklift/SML on 6/29/23.   Since then, she has been doing well. Pain improving.     Past Medical History  She has a past medical history of GERD (gastroesophageal reflux disease), Hypertension, Plantar fasciitis, and PONV (postoperative nausea and vomiting).    Past Surgical History  She has a past surgical history that includes TONSILLECTOMY, ADENOIDECTOMY; Laparoscopic cholecystectomy; Carpal tunnel release; Release of ulnar nerve at cubital tunnel (Right); Nasal septoplasty (N/A, 6/27/2022); Nasal stenosis repair (N/A, 6/27/2022); and Brow lift (N/A, 6/27/2022).    Family History  Her family history includes Breast cancer (age of onset: 42) in her mother; Colon cancer in her father; No Known Problems in her brother and sister.    Social History  She reports that she has been smoking cigarettes. She has a 5.00 pack-year smoking history. She has never used smokeless tobacco. She reports current alcohol use. She reports that she does not use drugs.    Allergies  She has No Known Allergies.    Medications  She has a current medication list which includes the following prescription(s): b complex vitamins, clonazepam, multivitamin, omega-3 fatty acids-vitamin e, and probiotic formula (inulin).       Objective:     There were no vitals taken for this visit.     Constitutional:   Vital signs are normal. She appears well-developed and well-nourished.     Head:                 Assessment:     1. Rhytidosis facialis          Plan:     Patient is doing well postoperatively.  Discussed wearing compression wrap as much as possible for the next week.  Avoid any smoking or secondhand smoke.  Bacitracin twice a day. Keflex TID. Follow up in 1 week for suture removal.  Discussed postoperative activity limitations.  All questions answered and patient encouraged to call with any questions  or concerns.

## 2023-08-04 ENCOUNTER — OFFICE VISIT (OUTPATIENT)
Dept: PLASTIC SURGERY | Facility: CLINIC | Age: 63
End: 2023-08-04

## 2023-08-04 DIAGNOSIS — Z41.1 ENCOUNTER FOR COSMETIC PROCEDURE: Primary | ICD-10-CM

## 2023-08-04 PROCEDURE — 99024 PR POST-OP FOLLOW-UP VISIT: ICD-10-PCS | Mod: ,,, | Performed by: OTOLARYNGOLOGY

## 2023-08-04 PROCEDURE — 99024 POSTOP FOLLOW-UP VISIT: CPT | Mod: ,,, | Performed by: OTOLARYNGOLOGY

## 2023-08-04 NOTE — PROGRESS NOTES
Subjective:     Chief Complaint: postop      Nenita Denis is a 63 y.o. female who present for postoperative visit.     Patient underwent necklift/SML on 6/29/23.   Since then, she has been doing well. Pain improving.     Past Medical History  She has a past medical history of GERD (gastroesophageal reflux disease), Hypertension, Plantar fasciitis, and PONV (postoperative nausea and vomiting).    Past Surgical History  She has a past surgical history that includes TONSILLECTOMY, ADENOIDECTOMY; Laparoscopic cholecystectomy; Carpal tunnel release; Release of ulnar nerve at cubital tunnel (Right); Nasal septoplasty (N/A, 6/27/2022); Nasal stenosis repair (N/A, 6/27/2022); and Brow lift (N/A, 6/27/2022).    Family History  Her family history includes Breast cancer (age of onset: 42) in her mother; Colon cancer in her father; No Known Problems in her brother and sister.    Social History  She reports that she has been smoking cigarettes. She has a 5.0 pack-year smoking history. She has never used smokeless tobacco. She reports current alcohol use. She reports that she does not use drugs.    Allergies  She has No Known Allergies.    Medications  She has a current medication list which includes the following prescription(s): b complex vitamins, clonazepam, multivitamin, omega-3 fatty acids-vitamin e, and probiotic formula (inulin).       Objective:     There were no vitals taken for this visit.     Constitutional:   Vital signs are normal. She appears well-developed and well-nourished.     Head:               Patient presents for cosmetic Botox. No previous adverse reaction to botulinum toxin.       Discussed benefits and risks of Botox injections, including headache, weakness/paralysis of muscles, asymmetry, eyebrow/lid drooping, pain, bruising, swelling, infection, and rare risk of systemic botulism. Verbal and written consent was obtained.     Patient agreed to proceed with treatment.   40 units total were  injected today:  20 in glabella   20 in lateral canthal region      Patient tolerated well with no complications. She was instructed not to rub or massage the treated areas and that she should avoid lying down, bending upside down, and strenuous exercise for the rest of the day.     Instructed to wait two weeks to assess response before presenting for a touch up injection, if needed.       Lot #:[id number]  Exp date: 09/2025      Assessment:     1. Encounter for cosmetic procedure            Plan:     Follow up as needed.  All questions answered and patient encouraged to call with any questions or concerns.

## (undated) DEVICE — GLOVE BIOGEL ECLIPSE SZ 7.5

## (undated) DEVICE — BLADE SURG STAINLESS STEEL #15

## (undated) DEVICE — SPONGE SUPER KERLIX 6X6.75IN

## (undated) DEVICE — BANDAGE MATRIX HK LOOP 6IN 5YD

## (undated) DEVICE — TOWEL OR DISP STRL BLUE 4/PK

## (undated) DEVICE — Device

## (undated) DEVICE — DRAPE HEAD/BAR 40 X 27 W/ADH

## (undated) DEVICE — SEE MEDLINE ITEM 157194

## (undated) DEVICE — NDL ECLIPSE SAFETY 18GX1-1/2IN

## (undated) DEVICE — CLOSURE SKIN STERI STRIP 1/2X4

## (undated) DEVICE — BLADE INFERIOR TURBINATE 5/PK

## (undated) DEVICE — SPONGE DERMACEA GAUZE 4X4

## (undated) DEVICE — DRAPE STERI INSTRUMENT 1018

## (undated) DEVICE — SPONGE GAUZE 16PLY 4X4

## (undated) DEVICE — DRAPE STERI-DRAPE 1000 17X11IN

## (undated) DEVICE — SEE MEDLINE ITEM 152572

## (undated) DEVICE — SEE MEDLINE ITEM 157196

## (undated) DEVICE — SPONGE COTTON TRAY 4X4IN

## (undated) DEVICE — CORD BIPOLAR 12 FOOT

## (undated) DEVICE — TUBING SUC UNIV W/CONN 12FT

## (undated) DEVICE — SYR BULB EAR/ULCER STER 3OZ

## (undated) DEVICE — ELECTRODE REM PLYHSV RETURN 9

## (undated) DEVICE — GOWN SMART IMP BREATHABLE XXLG

## (undated) DEVICE — TRAY FOLEY 16FR INFECTION CONT

## (undated) DEVICE — PENCIL ELECTROSURG HOLST W/BLD

## (undated) DEVICE — SEE MEDLINE ITEM 156934

## (undated) DEVICE — SPONGE PATTY SURGICAL .5X3IN

## (undated) DEVICE — SEE L#120831

## (undated) DEVICE — SYR 10CC LUER LOCK

## (undated) DEVICE — RETRACTOR RADIALUX LIGHTED

## (undated) DEVICE — SKINMARKER & RULER REGULAR X-F

## (undated) DEVICE — SUT PROLENE 6-0 BL MONOPC-1

## (undated) DEVICE — DRESSING TRANS 2X2 TEGADERM

## (undated) DEVICE — TUBE SUMP NASOGASTRIC 16FR

## (undated) DEVICE — SUT 5/0 18IN PLAIN FAST AB

## (undated) DEVICE — SYR B-D DISP CONTROL 10CC100/C

## (undated) DEVICE — MARKER SKIN STND TIP BLUE BARR

## (undated) DEVICE — DRESSING TELFA N ADH 3X8

## (undated) DEVICE — ELECTRODE NEEDLE 1IN

## (undated) DEVICE — BANDAGE DERMACEA STRETCH 4X1IN

## (undated) DEVICE — APPLICATOR COTTON TIP 6IN STRL

## (undated) DEVICE — BOWL STERILE LARGE 32OZ

## (undated) DEVICE — SUT 3/0 18IN PDS II CLR MON

## (undated) DEVICE — ADHESIVE MASTISOL VIAL 48/BX

## (undated) DEVICE — STAPLER SKIN ROTATING HEAD

## (undated) DEVICE — SEE MEDLINE ITEM 157166

## (undated) DEVICE — HEMOSTAT SURGICEL 4X8IN